# Patient Record
Sex: MALE | Race: WHITE | Employment: OTHER | URBAN - METROPOLITAN AREA
[De-identification: names, ages, dates, MRNs, and addresses within clinical notes are randomized per-mention and may not be internally consistent; named-entity substitution may affect disease eponyms.]

---

## 2019-01-01 ENCOUNTER — APPOINTMENT (OUTPATIENT)
Dept: CT IMAGING | Age: 84
DRG: 283 | End: 2019-01-01
Payer: MEDICARE

## 2019-01-01 ENCOUNTER — APPOINTMENT (OUTPATIENT)
Dept: GENERAL RADIOLOGY | Age: 84
DRG: 283 | End: 2019-01-01
Payer: MEDICARE

## 2019-01-01 ENCOUNTER — APPOINTMENT (OUTPATIENT)
Dept: MRI IMAGING | Age: 84
DRG: 283 | End: 2019-01-01
Payer: MEDICARE

## 2019-01-01 ENCOUNTER — HOSPITAL ENCOUNTER (INPATIENT)
Age: 84
LOS: 3 days | DRG: 283 | End: 2019-05-02
Attending: EMERGENCY MEDICINE | Admitting: INTERNAL MEDICINE
Payer: MEDICARE

## 2019-01-01 VITALS
TEMPERATURE: 98.1 F | OXYGEN SATURATION: 93 % | SYSTOLIC BLOOD PRESSURE: 118 MMHG | WEIGHT: 315 LBS | BODY MASS INDEX: 42.66 KG/M2 | RESPIRATION RATE: 20 BRPM | DIASTOLIC BLOOD PRESSURE: 63 MMHG | HEIGHT: 72 IN | HEART RATE: 70 BPM

## 2019-01-01 DIAGNOSIS — R79.89 INCREASED AMMONIA LEVEL: ICD-10-CM

## 2019-01-01 DIAGNOSIS — S12.120A OTHER CLOSED DISPLACED ODONTOID FRACTURE, INITIAL ENCOUNTER (HCC): ICD-10-CM

## 2019-01-01 DIAGNOSIS — I46.9 CARDIAC ARREST (HCC): Primary | ICD-10-CM

## 2019-01-01 DIAGNOSIS — S12.01XA CLOSED JEFFERSON FRACTURE, INITIAL ENCOUNTER (HCC): ICD-10-CM

## 2019-01-01 LAB
-: NORMAL
ABSOLUTE EOS #: 0.04 K/UL (ref 0–0.44)
ABSOLUTE EOS #: 0.04 K/UL (ref 0–0.44)
ABSOLUTE EOS #: 0.12 K/UL (ref 0–0.44)
ABSOLUTE EOS #: 0.18 K/UL (ref 0–0.4)
ABSOLUTE IMMATURE GRANULOCYTE: 0 K/UL (ref 0–0.3)
ABSOLUTE IMMATURE GRANULOCYTE: 0.06 K/UL (ref 0–0.3)
ABSOLUTE IMMATURE GRANULOCYTE: 0.07 K/UL (ref 0–0.3)
ABSOLUTE IMMATURE GRANULOCYTE: 0.23 K/UL (ref 0–0.3)
ABSOLUTE LYMPH #: 0.77 K/UL (ref 1.1–3.7)
ABSOLUTE LYMPH #: 1.17 K/UL (ref 1.1–3.7)
ABSOLUTE LYMPH #: 1.44 K/UL (ref 1.1–3.7)
ABSOLUTE LYMPH #: 4.39 K/UL (ref 1–4.8)
ABSOLUTE MONO #: 0.92 K/UL (ref 0.1–0.8)
ABSOLUTE MONO #: 0.93 K/UL (ref 0.1–1.2)
ABSOLUTE MONO #: 1.13 K/UL (ref 0.1–1.2)
ABSOLUTE MONO #: 1.49 K/UL (ref 0.1–1.2)
ACETAMINOPHEN LEVEL: <5 UG/ML (ref 10–30)
ALBUMIN SERPL-MCNC: 2.2 G/DL (ref 3.5–5.2)
ALBUMIN SERPL-MCNC: 2.7 G/DL (ref 3.5–5.2)
ALBUMIN SERPL-MCNC: 3 G/DL (ref 3.5–5.2)
ALBUMIN SERPL-MCNC: 3.2 G/DL (ref 3.5–5.2)
ALBUMIN/GLOBULIN RATIO: 1 (ref 1–2.5)
ALBUMIN/GLOBULIN RATIO: 1.1 (ref 1–2.5)
ALBUMIN/GLOBULIN RATIO: 1.2 (ref 1–2.5)
ALBUMIN/GLOBULIN RATIO: 1.3 (ref 1–2.5)
ALLEN TEST: ABNORMAL
ALLEN TEST: POSITIVE
ALLEN TEST: POSITIVE
ALP BLD-CCNC: 108 U/L (ref 40–129)
ALP BLD-CCNC: 133 U/L (ref 40–129)
ALP BLD-CCNC: 77 U/L (ref 40–129)
ALP BLD-CCNC: 94 U/L (ref 40–129)
ALT SERPL-CCNC: 135 U/L (ref 5–41)
ALT SERPL-CCNC: 231 U/L (ref 5–41)
ALT SERPL-CCNC: 359 U/L (ref 5–41)
ALT SERPL-CCNC: 496 U/L (ref 5–41)
AMMONIA: 64 UMOL/L (ref 16–60)
AMORPHOUS: NORMAL
AMPHETAMINE SCREEN URINE: NEGATIVE
ANION GAP SERPL CALCULATED.3IONS-SCNC: 10 MMOL/L (ref 9–17)
ANION GAP SERPL CALCULATED.3IONS-SCNC: 11 MMOL/L (ref 9–17)
ANION GAP SERPL CALCULATED.3IONS-SCNC: 6 MMOL/L (ref 9–17)
ANION GAP SERPL CALCULATED.3IONS-SCNC: 7 MMOL/L (ref 9–17)
ANION GAP SERPL CALCULATED.3IONS-SCNC: 9 MMOL/L (ref 9–17)
ANION GAP SERPL CALCULATED.3IONS-SCNC: 9 MMOL/L (ref 9–17)
ANION GAP: 10 MMOL/L (ref 7–16)
ANION GAP: 13 MMOL/L (ref 7–16)
AST SERPL-CCNC: 188 U/L
AST SERPL-CCNC: 25 U/L
AST SERPL-CCNC: 354 U/L
AST SERPL-CCNC: 69 U/L
BACTERIA: NORMAL
BARBITURATE SCREEN URINE: NEGATIVE
BASOPHILS # BLD: 0 % (ref 0–2)
BASOPHILS ABSOLUTE: 0 K/UL (ref 0–0.2)
BASOPHILS ABSOLUTE: 0.03 K/UL (ref 0–0.2)
BASOPHILS ABSOLUTE: 0.03 K/UL (ref 0–0.2)
BASOPHILS ABSOLUTE: <0.03 K/UL (ref 0–0.2)
BENZODIAZEPINE SCREEN, URINE: NEGATIVE
BILIRUB SERPL-MCNC: 0.3 MG/DL (ref 0.3–1.2)
BILIRUB SERPL-MCNC: 0.36 MG/DL (ref 0.3–1.2)
BILIRUB SERPL-MCNC: 0.42 MG/DL (ref 0.3–1.2)
BILIRUB SERPL-MCNC: 0.79 MG/DL (ref 0.3–1.2)
BILIRUBIN URINE: NEGATIVE
BUN BLDV-MCNC: 22 MG/DL (ref 8–23)
BUN BLDV-MCNC: 22 MG/DL (ref 8–23)
BUN BLDV-MCNC: 23 MG/DL (ref 8–23)
BUN BLDV-MCNC: 24 MG/DL (ref 8–23)
BUN BLDV-MCNC: 24 MG/DL (ref 8–23)
BUN BLDV-MCNC: 25 MG/DL (ref 8–23)
BUN BLDV-MCNC: 27 MG/DL (ref 8–23)
BUN/CREAT BLD: ABNORMAL (ref 9–20)
BUPRENORPHINE URINE: NORMAL
CALCIUM IONIZED: 1.14 MMOL/L (ref 1.13–1.33)
CALCIUM IONIZED: 1.15 MMOL/L (ref 1.13–1.33)
CALCIUM IONIZED: 1.15 MMOL/L (ref 1.13–1.33)
CALCIUM IONIZED: 1.16 MMOL/L (ref 1.13–1.33)
CALCIUM IONIZED: 1.19 MMOL/L (ref 1.13–1.33)
CALCIUM SERPL-MCNC: 7.7 MG/DL (ref 8.6–10.4)
CALCIUM SERPL-MCNC: 7.7 MG/DL (ref 8.6–10.4)
CALCIUM SERPL-MCNC: 7.9 MG/DL (ref 8.6–10.4)
CALCIUM SERPL-MCNC: 7.9 MG/DL (ref 8.6–10.4)
CALCIUM SERPL-MCNC: 8 MG/DL (ref 8.6–10.4)
CALCIUM SERPL-MCNC: 8.1 MG/DL (ref 8.6–10.4)
CALCIUM SERPL-MCNC: 8.3 MG/DL (ref 8.6–10.4)
CALCIUM SERPL-MCNC: 8.4 MG/DL (ref 8.6–10.4)
CALCIUM SERPL-MCNC: 8.6 MG/DL (ref 8.6–10.4)
CANNABINOID SCREEN URINE: NEGATIVE
CASTS UA: NORMAL /LPF (ref 0–8)
CHLORIDE BLD-SCNC: 107 MMOL/L (ref 98–107)
CHLORIDE BLD-SCNC: 108 MMOL/L (ref 98–107)
CHLORIDE BLD-SCNC: 110 MMOL/L (ref 98–107)
CHLORIDE BLD-SCNC: 111 MMOL/L (ref 98–107)
CHLORIDE BLD-SCNC: 112 MMOL/L (ref 98–107)
CHLORIDE BLD-SCNC: 113 MMOL/L (ref 98–107)
CHLORIDE BLD-SCNC: 113 MMOL/L (ref 98–107)
CHLORIDE BLD-SCNC: 114 MMOL/L (ref 98–107)
CHLORIDE BLD-SCNC: 115 MMOL/L (ref 98–107)
CO2: 18 MMOL/L (ref 20–31)
CO2: 19 MMOL/L (ref 20–31)
CO2: 20 MMOL/L (ref 20–31)
CO2: 21 MMOL/L (ref 20–31)
COCAINE METABOLITE, URINE: NEGATIVE
COLOR: YELLOW
COMMENT UA: ABNORMAL
CREAT SERPL-MCNC: 0.95 MG/DL (ref 0.7–1.2)
CREAT SERPL-MCNC: 1.01 MG/DL (ref 0.7–1.2)
CREAT SERPL-MCNC: 1.05 MG/DL (ref 0.7–1.2)
CREAT SERPL-MCNC: 1.06 MG/DL (ref 0.7–1.2)
CREAT SERPL-MCNC: 1.06 MG/DL (ref 0.7–1.2)
CREAT SERPL-MCNC: 1.22 MG/DL (ref 0.7–1.2)
CREAT SERPL-MCNC: 1.27 MG/DL (ref 0.7–1.2)
CREAT SERPL-MCNC: 1.39 MG/DL (ref 0.7–1.2)
CREAT SERPL-MCNC: 1.55 MG/DL (ref 0.7–1.2)
CRYSTALS, UA: NORMAL /HPF
CULTURE: NO GROWTH
DIFFERENTIAL TYPE: ABNORMAL
EKG ATRIAL RATE: 42 BPM
EKG ATRIAL RATE: 49 BPM
EKG ATRIAL RATE: 67 BPM
EKG P AXIS: 33 DEGREES
EKG P AXIS: 45 DEGREES
EKG P AXIS: 52 DEGREES
EKG P-R INTERVAL: 178 MS
EKG P-R INTERVAL: 190 MS
EKG P-R INTERVAL: 206 MS
EKG Q-T INTERVAL: 466 MS
EKG Q-T INTERVAL: 548 MS
EKG Q-T INTERVAL: 580 MS
EKG QRS DURATION: 104 MS
EKG QRS DURATION: 112 MS
EKG QRS DURATION: 118 MS
EKG QTC CALCULATION (BAZETT): 457 MS
EKG QTC CALCULATION (BAZETT): 492 MS
EKG QTC CALCULATION (BAZETT): 523 MS
EKG R AXIS: -3 DEGREES
EKG R AXIS: -7 DEGREES
EKG R AXIS: 3 DEGREES
EKG T AXIS: 108 DEGREES
EKG T AXIS: 109 DEGREES
EKG T AXIS: 54 DEGREES
EKG VENTRICULAR RATE: 42 BPM
EKG VENTRICULAR RATE: 49 BPM
EKG VENTRICULAR RATE: 67 BPM
EOSINOPHILS RELATIVE PERCENT: 0 % (ref 1–4)
EOSINOPHILS RELATIVE PERCENT: 0 % (ref 1–4)
EOSINOPHILS RELATIVE PERCENT: 1 % (ref 1–4)
EOSINOPHILS RELATIVE PERCENT: 1 % (ref 1–4)
EPITHELIAL CELLS UA: NORMAL /HPF (ref 0–5)
ETHANOL PERCENT: <0.01 %
ETHANOL: <10 MG/DL
FIO2: 100
FIO2: 40
FIO2: 45
FIO2: 50
FIO2: ABNORMAL
GFR AFRICAN AMERICAN: 52 ML/MIN
GFR AFRICAN AMERICAN: 59 ML/MIN
GFR AFRICAN AMERICAN: >60 ML/MIN
GFR NON-AFRICAN AMERICAN: 43 ML/MIN
GFR NON-AFRICAN AMERICAN: 49 ML/MIN
GFR NON-AFRICAN AMERICAN: 49 ML/MIN
GFR NON-AFRICAN AMERICAN: 54 ML/MIN
GFR NON-AFRICAN AMERICAN: 57 ML/MIN
GFR NON-AFRICAN AMERICAN: >60 ML/MIN
GFR SERPL CREATININE-BSD FRML MDRD: 59 ML/MIN
GFR SERPL CREATININE-BSD FRML MDRD: >60 ML/MIN
GFR SERPL CREATININE-BSD FRML MDRD: ABNORMAL ML/MIN/{1.73_M2}
GFR SERPL CREATININE-BSD FRML MDRD: NORMAL ML/MIN/{1.73_M2}
GLUCOSE BLD-MCNC: 126 MG/DL (ref 70–99)
GLUCOSE BLD-MCNC: 126 MG/DL (ref 70–99)
GLUCOSE BLD-MCNC: 129 MG/DL (ref 70–99)
GLUCOSE BLD-MCNC: 129 MG/DL (ref 74–100)
GLUCOSE BLD-MCNC: 130 MG/DL (ref 70–99)
GLUCOSE BLD-MCNC: 131 MG/DL (ref 70–99)
GLUCOSE BLD-MCNC: 146 MG/DL (ref 70–99)
GLUCOSE BLD-MCNC: 156 MG/DL (ref 70–99)
GLUCOSE BLD-MCNC: 164 MG/DL (ref 70–99)
GLUCOSE BLD-MCNC: 185 MG/DL (ref 70–99)
GLUCOSE BLD-MCNC: 187 MG/DL (ref 74–100)
GLUCOSE URINE: ABNORMAL
HAV IGM SER IA-ACNC: NONREACTIVE
HCO3 VENOUS: 22.3 MMOL/L (ref 22–29)
HCT VFR BLD CALC: 31.4 % (ref 40.7–50.3)
HCT VFR BLD CALC: 32.1 % (ref 40.7–50.3)
HCT VFR BLD CALC: 33.7 % (ref 40.7–50.3)
HCT VFR BLD CALC: 34.9 % (ref 40.7–50.3)
HCT VFR BLD CALC: 35.7 % (ref 40.7–50.3)
HCT VFR BLD CALC: 36.4 % (ref 40.7–50.3)
HCT VFR BLD CALC: 37 % (ref 40.7–50.3)
HCT VFR BLD CALC: 37 % (ref 40.7–50.3)
HCT VFR BLD CALC: 37.4 % (ref 40.7–50.3)
HCT VFR BLD CALC: 38.6 % (ref 40.7–50.3)
HCT VFR BLD CALC: 41.7 % (ref 40.7–50.3)
HEMOGLOBIN: 10 G/DL (ref 13–17)
HEMOGLOBIN: 10.4 G/DL (ref 13–17)
HEMOGLOBIN: 11.3 G/DL (ref 13–17)
HEMOGLOBIN: 11.3 G/DL (ref 13–17)
HEMOGLOBIN: 11.6 G/DL (ref 13–17)
HEMOGLOBIN: 12.1 G/DL (ref 13–17)
HEMOGLOBIN: 12.2 G/DL (ref 13–17)
HEMOGLOBIN: 12.3 G/DL (ref 13–17)
HEMOGLOBIN: 12.5 G/DL (ref 13–17)
HEMOGLOBIN: 13.1 G/DL (ref 13–17)
HEMOGLOBIN: 9.9 G/DL (ref 13–17)
HEPATITIS B CORE IGM ANTIBODY: NONREACTIVE
HEPATITIS B SURFACE ANTIGEN: NONREACTIVE
HEPATITIS C ANTIBODY: NONREACTIVE
HIV AG/AB: NONREACTIVE
IMMATURE GRANULOCYTES: 0 %
IMMATURE GRANULOCYTES: 1 %
INR BLD: 1
INR BLD: 1.1
KETONES, URINE: NEGATIVE
LACTIC ACID, SEPSIS WHOLE BLOOD: 1.2 MMOL/L (ref 0.5–1.9)
LACTIC ACID, SEPSIS: NORMAL MMOL/L (ref 0.5–1.9)
LACTIC ACID, WHOLE BLOOD: 1.4 MMOL/L (ref 0.7–2.1)
LACTIC ACID, WHOLE BLOOD: 1.4 MMOL/L (ref 0.7–2.1)
LACTIC ACID, WHOLE BLOOD: 1.5 MMOL/L (ref 0.7–2.1)
LACTIC ACID, WHOLE BLOOD: 2.3 MMOL/L (ref 0.7–2.1)
LACTIC ACID: NORMAL MMOL/L
LACTIC ACID: NORMAL MMOL/L
LEUKOCYTE ESTERASE, URINE: NEGATIVE
LIPASE: 36 U/L (ref 13–60)
LV EF: 45 %
LVEF MODALITY: NORMAL
LYMPHOCYTES # BLD: 13 % (ref 24–43)
LYMPHOCYTES # BLD: 24 % (ref 24–44)
LYMPHOCYTES # BLD: 7 % (ref 24–43)
LYMPHOCYTES # BLD: 8 % (ref 24–43)
Lab: NORMAL
MAGNESIUM: 1.8 MG/DL (ref 1.6–2.6)
MCH RBC QN AUTO: 29.8 PG (ref 25.2–33.5)
MCH RBC QN AUTO: 30.5 PG (ref 25.2–33.5)
MCH RBC QN AUTO: 30.8 PG (ref 25.2–33.5)
MCH RBC QN AUTO: 30.9 PG (ref 25.2–33.5)
MCH RBC QN AUTO: 30.9 PG (ref 25.2–33.5)
MCHC RBC AUTO-ENTMCNC: 31.4 G/DL (ref 28.4–34.8)
MCHC RBC AUTO-ENTMCNC: 31.4 G/DL (ref 28.4–34.8)
MCHC RBC AUTO-ENTMCNC: 31.5 G/DL (ref 28.4–34.8)
MCHC RBC AUTO-ENTMCNC: 32.4 G/DL (ref 28.4–34.8)
MCHC RBC AUTO-ENTMCNC: 33 G/DL (ref 28.4–34.8)
MCV RBC AUTO: 93.7 FL (ref 82.6–102.9)
MCV RBC AUTO: 94.1 FL (ref 82.6–102.9)
MCV RBC AUTO: 95.1 FL (ref 82.6–102.9)
MCV RBC AUTO: 98.1 FL (ref 82.6–102.9)
MCV RBC AUTO: 98.1 FL (ref 82.6–102.9)
MDMA URINE: NORMAL
METHADONE SCREEN, URINE: NEGATIVE
METHAMPHETAMINE, URINE: NORMAL
MODE: ABNORMAL
MONOCYTES # BLD: 12 % (ref 3–12)
MONOCYTES # BLD: 5 % (ref 1–7)
MONOCYTES # BLD: 8 % (ref 3–12)
MONOCYTES # BLD: 9 % (ref 3–12)
MORPHOLOGY: NORMAL
MRSA, DNA, NASAL: NORMAL
MUCUS: NORMAL
MYOGLOBIN: 291 NG/ML (ref 28–72)
NEGATIVE BASE EXCESS, ART: 3 (ref 0–2)
NEGATIVE BASE EXCESS, ART: 4 (ref 0–2)
NEGATIVE BASE EXCESS, ART: 5 (ref 0–2)
NEGATIVE BASE EXCESS, ART: 7 (ref 0–2)
NEGATIVE BASE EXCESS, ART: 7 (ref 0–2)
NEGATIVE BASE EXCESS, ART: 8 (ref 0–2)
NEGATIVE BASE EXCESS, VEN: 7 (ref 0–2)
NITRITE, URINE: NEGATIVE
NRBC AUTOMATED: 0 PER 100 WBC
O2 DEVICE/FLOW/%: ABNORMAL
O2 SAT, VEN: 87 % (ref 60–85)
OPIATES, URINE: NEGATIVE
OTHER OBSERVATIONS UA: NORMAL
OXYCODONE SCREEN URINE: NEGATIVE
PARTIAL THROMBOPLASTIN TIME: 21.9 SEC (ref 20.5–30.5)
PARTIAL THROMBOPLASTIN TIME: 22.7 SEC (ref 20.5–30.5)
PARTIAL THROMBOPLASTIN TIME: 30.1 SEC (ref 20.5–30.5)
PARTIAL THROMBOPLASTIN TIME: 44.4 SEC (ref 20.5–30.5)
PARTIAL THROMBOPLASTIN TIME: 49.3 SEC (ref 20.5–30.5)
PARTIAL THROMBOPLASTIN TIME: 52.5 SEC (ref 20.5–30.5)
PARTIAL THROMBOPLASTIN TIME: 60.7 SEC (ref 20.5–30.5)
PARTIAL THROMBOPLASTIN TIME: 62.1 SEC (ref 20.5–30.5)
PARTIAL THROMBOPLASTIN TIME: 76.8 SEC (ref 20.5–30.5)
PARTIAL THROMBOPLASTIN TIME: 81.4 SEC (ref 20.5–30.5)
PATIENT TEMP: 33
PATIENT TEMP: 33.1
PATIENT TEMP: ABNORMAL
PCO2, VEN: 60.8 MM HG (ref 41–51)
PDW BLD-RTO: 14 % (ref 11.8–14.4)
PDW BLD-RTO: 14.2 % (ref 11.8–14.4)
PDW BLD-RTO: 14.4 % (ref 11.8–14.4)
PDW BLD-RTO: 14.6 % (ref 11.8–14.4)
PDW BLD-RTO: 15 % (ref 11.8–14.4)
PH UA: 6 (ref 5–8)
PH VENOUS: 7.17 (ref 7.32–7.43)
PHENCYCLIDINE, URINE: NEGATIVE
PHOSPHORUS: 2.6 MG/DL (ref 2.5–4.5)
PHOSPHORUS: 2.7 MG/DL (ref 2.5–4.5)
PHOSPHORUS: 2.8 MG/DL (ref 2.5–4.5)
PHOSPHORUS: 2.9 MG/DL (ref 2.5–4.5)
PHOSPHORUS: 2.9 MG/DL (ref 2.5–4.5)
PLATELET # BLD: 116 K/UL (ref 138–453)
PLATELET # BLD: 148 K/UL (ref 138–453)
PLATELET # BLD: ABNORMAL K/UL (ref 138–453)
PLATELET ESTIMATE: ABNORMAL
PLATELET, FLUORESCENCE: 107 K/UL (ref 138–453)
PLATELET, FLUORESCENCE: 115 K/UL (ref 138–453)
PLATELET, FLUORESCENCE: 119 K/UL (ref 138–453)
PLATELET, IMMATURE FRACTION: 3.1 % (ref 1.1–10.3)
PLATELET, IMMATURE FRACTION: 3.2 % (ref 1.1–10.3)
PLATELET, IMMATURE FRACTION: 4.2 % (ref 1.1–10.3)
PMV BLD AUTO: 11.4 FL (ref 8.1–13.5)
PMV BLD AUTO: 11.5 FL (ref 8.1–13.5)
PMV BLD AUTO: ABNORMAL FL (ref 8.1–13.5)
PO2, VEN: 68.2 MM HG (ref 30–50)
POC CHLORIDE: 110 MMOL/L (ref 98–107)
POC CHLORIDE: 114 MMOL/L (ref 98–107)
POC CREATININE: 1.1 MG/DL (ref 0.51–1.19)
POC CREATININE: 1.39 MG/DL (ref 0.51–1.19)
POC HCO3: 17.4 MMOL/L (ref 21–28)
POC HCO3: 19.6 MMOL/L (ref 21–28)
POC HCO3: 20.4 MMOL/L (ref 21–28)
POC HCO3: 20.9 MMOL/L (ref 21–28)
POC HCO3: 21.2 MMOL/L (ref 21–28)
POC HCO3: 21.8 MMOL/L (ref 21–28)
POC HCO3: 22.4 MMOL/L (ref 21–28)
POC HCO3: 22.8 MMOL/L (ref 21–28)
POC HEMATOCRIT: 34 % (ref 41–53)
POC HEMATOCRIT: 41 % (ref 41–53)
POC HEMOGLOBIN: 11.5 G/DL (ref 13.5–17.5)
POC HEMOGLOBIN: 13.8 G/DL (ref 13.5–17.5)
POC IONIZED CALCIUM: 1.2 MMOL/L (ref 1.15–1.33)
POC IONIZED CALCIUM: 1.24 MMOL/L (ref 1.15–1.33)
POC LACTIC ACID: 1.36 MMOL/L (ref 0.56–1.39)
POC LACTIC ACID: 2.74 MMOL/L (ref 0.56–1.39)
POC O2 SATURATION: 100 % (ref 94–98)
POC O2 SATURATION: 93 % (ref 94–98)
POC O2 SATURATION: 94 % (ref 94–98)
POC O2 SATURATION: 94 % (ref 94–98)
POC O2 SATURATION: 95 % (ref 94–98)
POC O2 SATURATION: 96 % (ref 94–98)
POC O2 SATURATION: 96 % (ref 94–98)
POC O2 SATURATION: 97 % (ref 94–98)
POC PCO2 TEMP: 33 MM HG
POC PCO2 TEMP: 34 MM HG
POC PCO2 TEMP: ABNORMAL MM HG
POC PCO2: 35.5 MM HG (ref 35–48)
POC PCO2: 39.8 MM HG (ref 35–48)
POC PCO2: 39.8 MM HG (ref 35–48)
POC PCO2: 41.4 MM HG (ref 35–48)
POC PCO2: 41.8 MM HG (ref 35–48)
POC PCO2: 42.9 MM HG (ref 35–48)
POC PCO2: 48.4 MM HG (ref 35–48)
POC PCO2: 49 MM HG (ref 35–48)
POC PH TEMP: 7.38
POC PH TEMP: 7.39
POC PH TEMP: ABNORMAL
POC PH: 7.23 (ref 7.35–7.45)
POC PH: 7.28 (ref 7.35–7.45)
POC PH: 7.28 (ref 7.35–7.45)
POC PH: 7.3 (ref 7.35–7.45)
POC PH: 7.31 (ref 7.35–7.45)
POC PH: 7.33 (ref 7.35–7.45)
POC PH: 7.33 (ref 7.35–7.45)
POC PH: 7.34 (ref 7.35–7.45)
POC PO2 TEMP: 70 MM HG
POC PO2 TEMP: 74 MM HG
POC PO2 TEMP: ABNORMAL MM HG
POC PO2: 236.8 MM HG (ref 83–108)
POC PO2: 74.2 MM HG (ref 83–108)
POC PO2: 78.3 MM HG (ref 83–108)
POC PO2: 78.9 MM HG (ref 83–108)
POC PO2: 85.8 MM HG (ref 83–108)
POC PO2: 90.6 MM HG (ref 83–108)
POC PO2: 94.4 MM HG (ref 83–108)
POC PO2: 94.9 MM HG (ref 83–108)
POC POTASSIUM: 3.9 MMOL/L (ref 3.5–4.5)
POC POTASSIUM: 4.3 MMOL/L (ref 3.5–4.5)
POC SODIUM: 142 MMOL/L (ref 138–146)
POC SODIUM: 144 MMOL/L (ref 138–146)
POSITIVE BASE EXCESS, ART: ABNORMAL (ref 0–3)
POSITIVE BASE EXCESS, VEN: ABNORMAL (ref 0–3)
POTASSIUM SERPL-SCNC: 3.8 MMOL/L (ref 3.7–5.3)
POTASSIUM SERPL-SCNC: 4 MMOL/L (ref 3.7–5.3)
POTASSIUM SERPL-SCNC: 4.3 MMOL/L (ref 3.7–5.3)
POTASSIUM SERPL-SCNC: 4.3 MMOL/L (ref 3.7–5.3)
POTASSIUM SERPL-SCNC: 4.4 MMOL/L (ref 3.7–5.3)
POTASSIUM SERPL-SCNC: 4.5 MMOL/L (ref 3.7–5.3)
POTASSIUM SERPL-SCNC: 4.7 MMOL/L (ref 3.7–5.3)
POTASSIUM SERPL-SCNC: 4.7 MMOL/L (ref 3.7–5.3)
POTASSIUM SERPL-SCNC: 5.6 MMOL/L (ref 3.7–5.3)
PROPOXYPHENE, URINE: NORMAL
PROTEIN UA: ABNORMAL
PROTHROMBIN TIME: 10.7 SEC (ref 9–12)
PROTHROMBIN TIME: 11.2 SEC (ref 9–12)
RBC # BLD: 3.2 M/UL (ref 4.21–5.77)
RBC # BLD: 3.89 M/UL (ref 4.21–5.77)
RBC # BLD: 3.95 M/UL (ref 4.21–5.77)
RBC # BLD: 4.1 M/UL (ref 4.21–5.77)
RBC # BLD: 4.25 M/UL (ref 4.21–5.77)
RBC # BLD: ABNORMAL 10*6/UL
RBC UA: NORMAL /HPF (ref 0–4)
RENAL EPITHELIAL, UA: NORMAL /HPF
SALICYLATE LEVEL: <1 MG/DL (ref 3–10)
SAMPLE SITE: ABNORMAL
SEG NEUTROPHILS: 70 % (ref 36–66)
SEG NEUTROPHILS: 73 % (ref 36–65)
SEG NEUTROPHILS: 83 % (ref 36–65)
SEG NEUTROPHILS: 83 % (ref 36–65)
SEGMENTED NEUTROPHILS ABSOLUTE COUNT: 12.81 K/UL (ref 1.8–7.7)
SEGMENTED NEUTROPHILS ABSOLUTE COUNT: 15.8 K/UL (ref 1.5–8.1)
SEGMENTED NEUTROPHILS ABSOLUTE COUNT: 6.74 K/UL (ref 1.5–8.1)
SEGMENTED NEUTROPHILS ABSOLUTE COUNT: 9.06 K/UL (ref 1.5–8.1)
SODIUM BLD-SCNC: 137 MMOL/L (ref 135–144)
SODIUM BLD-SCNC: 138 MMOL/L (ref 135–144)
SODIUM BLD-SCNC: 138 MMOL/L (ref 135–144)
SODIUM BLD-SCNC: 139 MMOL/L (ref 135–144)
SODIUM BLD-SCNC: 140 MMOL/L (ref 135–144)
SODIUM BLD-SCNC: 142 MMOL/L (ref 135–144)
SODIUM BLD-SCNC: 142 MMOL/L (ref 135–144)
SPECIFIC GRAVITY UA: 1.01 (ref 1–1.03)
SPECIMEN DESCRIPTION: NORMAL
SPECIMEN DESCRIPTION: NORMAL
TCO2 (CALC), ART: 19 MMOL/L (ref 22–29)
TCO2 (CALC), ART: 21 MMOL/L (ref 22–29)
TCO2 (CALC), ART: 22 MMOL/L (ref 22–29)
TCO2 (CALC), ART: 23 MMOL/L (ref 22–29)
TCO2 (CALC), ART: 24 MMOL/L (ref 22–29)
TCO2 (CALC), ART: 24 MMOL/L (ref 22–29)
TEST INFORMATION: NORMAL
TOTAL CK: 88 U/L (ref 39–308)
TOTAL CO2, VENOUS: 24 MMOL/L (ref 23–30)
TOTAL PROTEIN: 4.4 G/DL (ref 6.4–8.3)
TOTAL PROTEIN: 5 G/DL (ref 6.4–8.3)
TOTAL PROTEIN: 5.3 G/DL (ref 6.4–8.3)
TOTAL PROTEIN: 6 G/DL (ref 6.4–8.3)
TOXIC TRICYCLIC SC,BLOOD: NEGATIVE
TRICHOMONAS: NORMAL
TRICYCLIC ANTIDEPRESSANTS, UR: NORMAL
TROPONIN INTERP: ABNORMAL
TROPONIN T: ABNORMAL NG/ML
TROPONIN, HIGH SENSITIVITY: 109 NG/L (ref 0–22)
TROPONIN, HIGH SENSITIVITY: 29 NG/L (ref 0–22)
TROPONIN, HIGH SENSITIVITY: 50 NG/L (ref 0–22)
TURBIDITY: CLEAR
URINE HGB: ABNORMAL
UROBILINOGEN, URINE: NORMAL
WBC # BLD: 10.9 K/UL (ref 3.5–11.3)
WBC # BLD: 18.3 K/UL (ref 3.5–11.3)
WBC # BLD: 19 K/UL (ref 3.5–11.3)
WBC # BLD: 9.3 K/UL (ref 3.5–11.3)
WBC # BLD: 9.5 K/UL (ref 3.5–11.3)
WBC # BLD: ABNORMAL 10*3/UL
WBC UA: NORMAL /HPF (ref 0–5)
YEAST: NORMAL

## 2019-01-01 PROCEDURE — 82550 ASSAY OF CK (CPK): CPT

## 2019-01-01 PROCEDURE — 6360000002 HC RX W HCPCS: Performed by: STUDENT IN AN ORGANIZED HEALTH CARE EDUCATION/TRAINING PROGRAM

## 2019-01-01 PROCEDURE — 99291 CRITICAL CARE FIRST HOUR: CPT | Performed by: INTERNAL MEDICINE

## 2019-01-01 PROCEDURE — 70450 CT HEAD/BRAIN W/O DYE: CPT

## 2019-01-01 PROCEDURE — 93306 TTE W/DOPPLER COMPLETE: CPT

## 2019-01-01 PROCEDURE — 82947 ASSAY GLUCOSE BLOOD QUANT: CPT

## 2019-01-01 PROCEDURE — 99291 CRITICAL CARE FIRST HOUR: CPT

## 2019-01-01 PROCEDURE — 80074 ACUTE HEPATITIS PANEL: CPT

## 2019-01-01 PROCEDURE — 6360000002 HC RX W HCPCS: Performed by: PSYCHIATRY & NEUROLOGY

## 2019-01-01 PROCEDURE — 83690 ASSAY OF LIPASE: CPT

## 2019-01-01 PROCEDURE — 94770 HC ETCO2 MONITOR DAILY: CPT

## 2019-01-01 PROCEDURE — 6360000002 HC RX W HCPCS: Performed by: HOSPITALIST

## 2019-01-01 PROCEDURE — 72128 CT CHEST SPINE W/O DYE: CPT

## 2019-01-01 PROCEDURE — 94760 N-INVAS EAR/PLS OXIMETRY 1: CPT

## 2019-01-01 PROCEDURE — 2700000000 HC OXYGEN THERAPY PER DAY

## 2019-01-01 PROCEDURE — 87641 MR-STAPH DNA AMP PROBE: CPT

## 2019-01-01 PROCEDURE — 85610 PROTHROMBIN TIME: CPT

## 2019-01-01 PROCEDURE — 82803 BLOOD GASES ANY COMBINATION: CPT

## 2019-01-01 PROCEDURE — 71045 X-RAY EXAM CHEST 1 VIEW: CPT

## 2019-01-01 PROCEDURE — 6360000002 HC RX W HCPCS

## 2019-01-01 PROCEDURE — 6370000000 HC RX 637 (ALT 250 FOR IP): Performed by: HOSPITALIST

## 2019-01-01 PROCEDURE — 87040 BLOOD CULTURE FOR BACTERIA: CPT

## 2019-01-01 PROCEDURE — 2500000003 HC RX 250 WO HCPCS: Performed by: STUDENT IN AN ORGANIZED HEALTH CARE EDUCATION/TRAINING PROGRAM

## 2019-01-01 PROCEDURE — 36556 INSERT NON-TUNNEL CV CATH: CPT

## 2019-01-01 PROCEDURE — 36620 INSERTION CATHETER ARTERY: CPT

## 2019-01-01 PROCEDURE — 85025 COMPLETE CBC W/AUTO DIFF WBC: CPT

## 2019-01-01 PROCEDURE — 2000000000 HC ICU R&B

## 2019-01-01 PROCEDURE — 95951 PR EEG MONITORING/VIDEORECORD: CPT | Performed by: PSYCHIATRY & NEUROLOGY

## 2019-01-01 PROCEDURE — 80053 COMPREHEN METABOLIC PANEL: CPT

## 2019-01-01 PROCEDURE — 84132 ASSAY OF SERUM POTASSIUM: CPT

## 2019-01-01 PROCEDURE — 6370000000 HC RX 637 (ALT 250 FOR IP): Performed by: STUDENT IN AN ORGANIZED HEALTH CARE EDUCATION/TRAINING PROGRAM

## 2019-01-01 PROCEDURE — 84484 ASSAY OF TROPONIN QUANT: CPT

## 2019-01-01 PROCEDURE — 82330 ASSAY OF CALCIUM: CPT

## 2019-01-01 PROCEDURE — 84100 ASSAY OF PHOSPHORUS: CPT

## 2019-01-01 PROCEDURE — 6360000002 HC RX W HCPCS: Performed by: EMERGENCY MEDICINE

## 2019-01-01 PROCEDURE — 80048 BASIC METABOLIC PNL TOTAL CA: CPT

## 2019-01-01 PROCEDURE — G0480 DRUG TEST DEF 1-7 CLASSES: HCPCS

## 2019-01-01 PROCEDURE — 51702 INSERT TEMP BLADDER CATH: CPT

## 2019-01-01 PROCEDURE — 2580000003 HC RX 258: Performed by: STUDENT IN AN ORGANIZED HEALTH CARE EDUCATION/TRAINING PROGRAM

## 2019-01-01 PROCEDURE — 70498 CT ANGIOGRAPHY NECK: CPT

## 2019-01-01 PROCEDURE — 83735 ASSAY OF MAGNESIUM: CPT

## 2019-01-01 PROCEDURE — 85730 THROMBOPLASTIN TIME PARTIAL: CPT

## 2019-01-01 PROCEDURE — 36415 COLL VENOUS BLD VENIPUNCTURE: CPT

## 2019-01-01 PROCEDURE — 2580000003 HC RX 258: Performed by: EMERGENCY MEDICINE

## 2019-01-01 PROCEDURE — 80307 DRUG TEST PRSMV CHEM ANLYZR: CPT

## 2019-01-01 PROCEDURE — 83605 ASSAY OF LACTIC ACID: CPT

## 2019-01-01 PROCEDURE — 94003 VENT MGMT INPAT SUBQ DAY: CPT

## 2019-01-01 PROCEDURE — 85018 HEMOGLOBIN: CPT

## 2019-01-01 PROCEDURE — 99233 SBSQ HOSP IP/OBS HIGH 50: CPT | Performed by: PSYCHIATRY & NEUROLOGY

## 2019-01-01 PROCEDURE — 72170 X-RAY EXAM OF PELVIS: CPT

## 2019-01-01 PROCEDURE — 85014 HEMATOCRIT: CPT

## 2019-01-01 PROCEDURE — 85055 RETICULATED PLATELET ASSAY: CPT

## 2019-01-01 PROCEDURE — 2500000003 HC RX 250 WO HCPCS: Performed by: EMERGENCY MEDICINE

## 2019-01-01 PROCEDURE — 95822 EEG COMA OR SLEEP ONLY: CPT

## 2019-01-01 PROCEDURE — 5A1945Z RESPIRATORY VENTILATION, 24-96 CONSECUTIVE HOURS: ICD-10-PCS | Performed by: INTERNAL MEDICINE

## 2019-01-01 PROCEDURE — 72192 CT PELVIS W/O DYE: CPT

## 2019-01-01 PROCEDURE — 87086 URINE CULTURE/COLONY COUNT: CPT

## 2019-01-01 PROCEDURE — 82565 ASSAY OF CREATININE: CPT

## 2019-01-01 PROCEDURE — 82140 ASSAY OF AMMONIA: CPT

## 2019-01-01 PROCEDURE — 96365 THER/PROPH/DIAG IV INF INIT: CPT

## 2019-01-01 PROCEDURE — 72131 CT LUMBAR SPINE W/O DYE: CPT

## 2019-01-01 PROCEDURE — APPSS15 APP SPLIT SHARED TIME 0-15 MINUTES: Performed by: NURSE PRACTITIONER

## 2019-01-01 PROCEDURE — 31500 INSERT EMERGENCY AIRWAY: CPT

## 2019-01-01 PROCEDURE — 94762 N-INVAS EAR/PLS OXIMTRY CONT: CPT

## 2019-01-01 PROCEDURE — 99222 1ST HOSP IP/OBS MODERATE 55: CPT | Performed by: PSYCHIATRY & NEUROLOGY

## 2019-01-01 PROCEDURE — 2580000003 HC RX 258: Performed by: INTERNAL MEDICINE

## 2019-01-01 PROCEDURE — 82435 ASSAY OF BLOOD CHLORIDE: CPT

## 2019-01-01 PROCEDURE — 94002 VENT MGMT INPAT INIT DAY: CPT

## 2019-01-01 PROCEDURE — 0BH18EZ INSERTION OF ENDOTRACHEAL AIRWAY INTO TRACHEA, VIA NATURAL OR ARTIFICIAL OPENING ENDOSCOPIC: ICD-10-PCS | Performed by: EMERGENCY MEDICINE

## 2019-01-01 PROCEDURE — 6360000004 HC RX CONTRAST MEDICATION: Performed by: EMERGENCY MEDICINE

## 2019-01-01 PROCEDURE — 95951 HC EEG MONITORING VIDEO RECORDING: CPT

## 2019-01-01 PROCEDURE — 72125 CT NECK SPINE W/O DYE: CPT

## 2019-01-01 PROCEDURE — 99232 SBSQ HOSP IP/OBS MODERATE 35: CPT | Performed by: PSYCHIATRY & NEUROLOGY

## 2019-01-01 PROCEDURE — 83874 ASSAY OF MYOGLOBIN: CPT

## 2019-01-01 PROCEDURE — 85027 COMPLETE CBC AUTOMATED: CPT

## 2019-01-01 PROCEDURE — 81001 URINALYSIS AUTO W/SCOPE: CPT

## 2019-01-01 PROCEDURE — 87389 HIV-1 AG W/HIV-1&-2 AB AG IA: CPT

## 2019-01-01 PROCEDURE — 93005 ELECTROCARDIOGRAM TRACING: CPT

## 2019-01-01 PROCEDURE — 2500000003 HC RX 250 WO HCPCS

## 2019-01-01 PROCEDURE — 36600 WITHDRAWAL OF ARTERIAL BLOOD: CPT

## 2019-01-01 PROCEDURE — 84295 ASSAY OF SERUM SODIUM: CPT

## 2019-01-01 RX ORDER — ATROPINE SULFATE 0.4 MG/ML
AMPUL (ML) INJECTION
Status: DISCONTINUED
Start: 2019-01-01 | End: 2019-01-01

## 2019-01-01 RX ORDER — PROPOFOL 10 MG/ML
10 INJECTION, EMULSION INTRAVENOUS
Status: DISCONTINUED | OUTPATIENT
Start: 2019-01-01 | End: 2019-01-01

## 2019-01-01 RX ORDER — 0.9 % SODIUM CHLORIDE 0.9 %
1000 INTRAVENOUS SOLUTION INTRAVENOUS ONCE
Status: COMPLETED | OUTPATIENT
Start: 2019-01-01 | End: 2019-01-01

## 2019-01-01 RX ORDER — 0.9 % SODIUM CHLORIDE 0.9 %
500 INTRAVENOUS SOLUTION INTRAVENOUS ONCE
Status: COMPLETED | OUTPATIENT
Start: 2019-01-01 | End: 2019-01-01

## 2019-01-01 RX ORDER — ATROPINE SULFATE 0.1 MG/ML
0.5 INJECTION INTRAVENOUS ONCE
Status: DISCONTINUED | OUTPATIENT
Start: 2019-01-01 | End: 2019-01-01

## 2019-01-01 RX ORDER — AMLODIPINE BESYLATE 5 MG/1
5 TABLET ORAL DAILY
Status: DISCONTINUED | OUTPATIENT
Start: 2019-01-01 | End: 2019-01-01

## 2019-01-01 RX ORDER — MORPHINE SULFATE 4 MG/ML
4 INJECTION, SOLUTION INTRAMUSCULAR; INTRAVENOUS
Status: DISCONTINUED | OUTPATIENT
Start: 2019-01-01 | End: 2019-01-01 | Stop reason: HOSPADM

## 2019-01-01 RX ORDER — ASPIRIN 81 MG/1
81 TABLET, CHEWABLE ORAL DAILY
Status: DISCONTINUED | OUTPATIENT
Start: 2019-01-01 | End: 2019-01-01

## 2019-01-01 RX ORDER — LORAZEPAM 2 MG/ML
1 INJECTION INTRAMUSCULAR ONCE
Status: DISCONTINUED | OUTPATIENT
Start: 2019-01-01 | End: 2019-01-01

## 2019-01-01 RX ORDER — SODIUM CHLORIDE 9 MG/ML
INJECTION, SOLUTION INTRAVENOUS CONTINUOUS
Status: DISCONTINUED | OUTPATIENT
Start: 2019-01-01 | End: 2019-01-01

## 2019-01-01 RX ORDER — MORPHINE SULFATE 2 MG/ML
2 INJECTION, SOLUTION INTRAMUSCULAR; INTRAVENOUS
Status: DISCONTINUED | OUTPATIENT
Start: 2019-01-01 | End: 2019-01-01

## 2019-01-01 RX ORDER — LORAZEPAM 2 MG/ML
1 INJECTION INTRAMUSCULAR
Status: DISCONTINUED | OUTPATIENT
Start: 2019-01-01 | End: 2019-01-01 | Stop reason: HOSPADM

## 2019-01-01 RX ORDER — ATROPINE SULFATE 0.1 MG/ML
1 INJECTION INTRAVENOUS ONCE
Status: DISCONTINUED | OUTPATIENT
Start: 2019-01-01 | End: 2019-01-01

## 2019-01-01 RX ORDER — FENTANYL CITRATE 50 UG/ML
50 INJECTION, SOLUTION INTRAMUSCULAR; INTRAVENOUS ONCE
Status: DISCONTINUED | OUTPATIENT
Start: 2019-01-01 | End: 2019-01-01

## 2019-01-01 RX ORDER — SODIUM CHLORIDE 0.9 % (FLUSH) 0.9 %
10 SYRINGE (ML) INJECTION PRN
Status: DISCONTINUED | OUTPATIENT
Start: 2019-01-01 | End: 2019-01-01

## 2019-01-01 RX ORDER — ONDANSETRON 2 MG/ML
4 INJECTION INTRAMUSCULAR; INTRAVENOUS EVERY 6 HOURS PRN
Status: DISCONTINUED | OUTPATIENT
Start: 2019-01-01 | End: 2019-01-01

## 2019-01-01 RX ORDER — VECURONIUM BROMIDE 1 MG/ML
10 INJECTION, POWDER, LYOPHILIZED, FOR SOLUTION INTRAVENOUS ONCE
Status: COMPLETED | OUTPATIENT
Start: 2019-01-01 | End: 2019-01-01

## 2019-01-01 RX ORDER — LORAZEPAM 2 MG/ML
0.5 INJECTION INTRAMUSCULAR
Status: DISCONTINUED | OUTPATIENT
Start: 2019-01-01 | End: 2019-01-01 | Stop reason: HOSPADM

## 2019-01-01 RX ORDER — FENTANYL CITRATE 50 UG/ML
50 INJECTION, SOLUTION INTRAMUSCULAR; INTRAVENOUS ONCE
Status: COMPLETED | OUTPATIENT
Start: 2019-01-01 | End: 2019-01-01

## 2019-01-01 RX ORDER — HEPARIN SODIUM 1000 [USP'U]/ML
4000 INJECTION, SOLUTION INTRAVENOUS; SUBCUTANEOUS PRN
Status: DISCONTINUED | OUTPATIENT
Start: 2019-01-01 | End: 2019-01-01

## 2019-01-01 RX ORDER — SODIUM CHLORIDE 0.9 % (FLUSH) 0.9 %
10 SYRINGE (ML) INJECTION EVERY 12 HOURS SCHEDULED
Status: DISCONTINUED | OUTPATIENT
Start: 2019-01-01 | End: 2019-01-01

## 2019-01-01 RX ORDER — 0.9 % SODIUM CHLORIDE 0.9 %
500 INTRAVENOUS SOLUTION INTRAVENOUS ONCE
Status: DISCONTINUED | OUTPATIENT
Start: 2019-01-01 | End: 2019-01-01

## 2019-01-01 RX ORDER — SODIUM CHLORIDE, SODIUM LACTATE, POTASSIUM CHLORIDE, AND CALCIUM CHLORIDE .6; .31; .03; .02 G/100ML; G/100ML; G/100ML; G/100ML
1000 INJECTION, SOLUTION INTRAVENOUS ONCE
Status: COMPLETED | OUTPATIENT
Start: 2019-01-01 | End: 2019-01-01

## 2019-01-01 RX ORDER — GLYCOPYRROLATE 0.2 MG/ML
0.2 INJECTION INTRAMUSCULAR; INTRAVENOUS EVERY 4 HOURS PRN
Status: DISCONTINUED | OUTPATIENT
Start: 2019-01-01 | End: 2019-01-01 | Stop reason: HOSPADM

## 2019-01-01 RX ORDER — LORAZEPAM 2 MG/ML
1 INJECTION INTRAMUSCULAR ONCE
Status: COMPLETED | OUTPATIENT
Start: 2019-01-01 | End: 2019-01-01

## 2019-01-01 RX ORDER — MORPHINE SULFATE 2 MG/ML
2 INJECTION, SOLUTION INTRAMUSCULAR; INTRAVENOUS
Status: DISCONTINUED | OUTPATIENT
Start: 2019-01-01 | End: 2019-01-01 | Stop reason: HOSPADM

## 2019-01-01 RX ORDER — HEPARIN SODIUM 10000 [USP'U]/100ML
6.88 INJECTION, SOLUTION INTRAVENOUS CONTINUOUS
Status: DISCONTINUED | OUTPATIENT
Start: 2019-01-01 | End: 2019-01-01

## 2019-01-01 RX ORDER — DEXTROSE MONOHYDRATE 50 MG/ML
INJECTION, SOLUTION INTRAVENOUS
Status: DISCONTINUED
Start: 2019-01-01 | End: 2019-01-01

## 2019-01-01 RX ORDER — HEPARIN SODIUM 1000 [USP'U]/ML
2000 INJECTION, SOLUTION INTRAVENOUS; SUBCUTANEOUS PRN
Status: DISCONTINUED | OUTPATIENT
Start: 2019-01-01 | End: 2019-01-01

## 2019-01-01 RX ORDER — LEVETIRACETAM 5 MG/ML
500 INJECTION INTRAVASCULAR EVERY 12 HOURS
Status: DISCONTINUED | OUTPATIENT
Start: 2019-01-01 | End: 2019-01-01

## 2019-01-01 RX ORDER — CHLORHEXIDINE GLUCONATE 0.12 MG/ML
15 RINSE ORAL 2 TIMES DAILY
Status: DISCONTINUED | OUTPATIENT
Start: 2019-01-01 | End: 2019-01-01

## 2019-01-01 RX ORDER — MAGNESIUM SULFATE 1 G/100ML
1 INJECTION INTRAVENOUS PRN
Status: DISCONTINUED | OUTPATIENT
Start: 2019-01-01 | End: 2019-01-01

## 2019-01-01 RX ORDER — DOPAMINE HYDROCHLORIDE 160 MG/100ML
2.5 INJECTION, SOLUTION INTRAVENOUS CONTINUOUS
Status: DISCONTINUED | OUTPATIENT
Start: 2019-01-01 | End: 2019-01-01

## 2019-01-01 RX ORDER — ATORVASTATIN CALCIUM 40 MG/1
40 TABLET, FILM COATED ORAL DAILY
COMMUNITY

## 2019-01-01 RX ORDER — ACETAMINOPHEN 160 MG
1 TABLET,DISINTEGRATING ORAL 2 TIMES DAILY
COMMUNITY

## 2019-01-01 RX ORDER — LEVETIRACETAM 10 MG/ML
1000 INJECTION INTRAVASCULAR ONCE
Status: COMPLETED | OUTPATIENT
Start: 2019-01-01 | End: 2019-01-01

## 2019-01-01 RX ORDER — DOPAMINE HYDROCHLORIDE 160 MG/100ML
INJECTION, SOLUTION INTRAVENOUS
Status: COMPLETED
Start: 2019-01-01 | End: 2019-01-01

## 2019-01-01 RX ORDER — DEXTROSE MONOHYDRATE 25 G/50ML
25 INJECTION, SOLUTION INTRAVENOUS ONCE
Status: COMPLETED | OUTPATIENT
Start: 2019-01-01 | End: 2019-01-01

## 2019-01-01 RX ORDER — POTASSIUM CHLORIDE 7.45 MG/ML
10 INJECTION INTRAVENOUS PRN
Status: DISCONTINUED | OUTPATIENT
Start: 2019-01-01 | End: 2019-01-01

## 2019-01-01 RX ORDER — MORPHINE SULFATE 4 MG/ML
4 INJECTION, SOLUTION INTRAMUSCULAR; INTRAVENOUS ONCE
Status: COMPLETED | OUTPATIENT
Start: 2019-01-01 | End: 2019-01-01

## 2019-01-01 RX ORDER — FAMOTIDINE 20 MG/1
20 TABLET, FILM COATED ORAL DAILY
Status: DISCONTINUED | OUTPATIENT
Start: 2019-05-03 | End: 2019-01-01

## 2019-01-01 RX ORDER — MORPHINE SULFATE 4 MG/ML
4 INJECTION, SOLUTION INTRAMUSCULAR; INTRAVENOUS
Status: DISCONTINUED | OUTPATIENT
Start: 2019-01-01 | End: 2019-01-01

## 2019-01-01 RX ADMIN — PROPOFOL 10 MCG/KG/MIN: 10 INJECTION, EMULSION INTRAVENOUS at 17:50

## 2019-01-01 RX ADMIN — Medication 2 MCG/MIN: at 10:06

## 2019-01-01 RX ADMIN — ENOXAPARIN SODIUM 40 MG: 40 INJECTION SUBCUTANEOUS at 20:53

## 2019-01-01 RX ADMIN — SODIUM CHLORIDE 1000 ML: 9 INJECTION, SOLUTION INTRAVENOUS at 12:34

## 2019-01-01 RX ADMIN — LEVETIRACETAM 500 MG: 5 INJECTION INTRAVENOUS at 06:59

## 2019-01-01 RX ADMIN — FAMOTIDINE 20 MG: 10 INJECTION, SOLUTION INTRAVENOUS at 08:07

## 2019-01-01 RX ADMIN — HEPARIN SODIUM AND DEXTROSE 6.88 UNITS/KG/HR: 10000; 5 INJECTION INTRAVENOUS at 12:12

## 2019-01-01 RX ADMIN — Medication 9 MG/HR: at 00:21

## 2019-01-01 RX ADMIN — NICARDIPINE HYDROCHLORIDE 2.5 MG/HR: 0.1 INJECTION, SOLUTION INTRAVENOUS at 20:44

## 2019-01-01 RX ADMIN — ASPIRIN 81 MG: 81 TABLET, CHEWABLE ORAL at 08:07

## 2019-01-01 RX ADMIN — Medication 8 MCG/MIN: at 00:21

## 2019-01-01 RX ADMIN — SODIUM CHLORIDE, POTASSIUM CHLORIDE, SODIUM LACTATE AND CALCIUM CHLORIDE 1000 ML: 600; 310; 30; 20 INJECTION, SOLUTION INTRAVENOUS at 08:59

## 2019-01-01 RX ADMIN — VANCOMYCIN HYDROCHLORIDE 2000 MG: 1 INJECTION, POWDER, LYOPHILIZED, FOR SOLUTION INTRAVENOUS at 16:12

## 2019-01-01 RX ADMIN — LORAZEPAM 1 MG: 2 INJECTION INTRAMUSCULAR; INTRAVENOUS at 12:16

## 2019-01-01 RX ADMIN — SODIUM CHLORIDE, POTASSIUM CHLORIDE, SODIUM LACTATE AND CALCIUM CHLORIDE 1000 ML: 600; 310; 30; 20 INJECTION, SOLUTION INTRAVENOUS at 22:06

## 2019-01-01 RX ADMIN — Medication 4 MG/HR: at 19:23

## 2019-01-01 RX ADMIN — DEXTROSE MONOHYDRATE 25 G: 500 INJECTION PARENTERAL at 07:44

## 2019-01-01 RX ADMIN — FAMOTIDINE 20 MG: 10 INJECTION, SOLUTION INTRAVENOUS at 20:53

## 2019-01-01 RX ADMIN — Medication 10 ML: at 08:53

## 2019-01-01 RX ADMIN — CISATRACURIUM BESYLATE 1 MCG/KG/MIN: 10 INJECTION, SOLUTION INTRAVENOUS at 04:35

## 2019-01-01 RX ADMIN — SODIUM CHLORIDE 500 ML: 9 INJECTION, SOLUTION INTRAVENOUS at 17:25

## 2019-01-01 RX ADMIN — CHLORHEXIDINE GLUCONATE 0.12% ORAL RINSE 15 ML: 1.2 LIQUID ORAL at 20:10

## 2019-01-01 RX ADMIN — NICARDIPINE HYDROCHLORIDE 5 MG/HR: 0.1 INJECTION, SOLUTION INTRAVENOUS at 03:08

## 2019-01-01 RX ADMIN — HEPARIN SODIUM 2000 UNITS: 1000 INJECTION INTRAVENOUS; SUBCUTANEOUS at 22:23

## 2019-01-01 RX ADMIN — CHLORHEXIDINE GLUCONATE 0.12% ORAL RINSE 15 ML: 1.2 LIQUID ORAL at 20:36

## 2019-01-01 RX ADMIN — ASPIRIN 81 MG: 81 TABLET, CHEWABLE ORAL at 08:53

## 2019-01-01 RX ADMIN — Medication 10 MG/HR: at 12:23

## 2019-01-01 RX ADMIN — CHLORHEXIDINE GLUCONATE 0.12% ORAL RINSE 15 ML: 1.2 LIQUID ORAL at 08:53

## 2019-01-01 RX ADMIN — NICARDIPINE HYDROCHLORIDE 4 MG/HR: 0.1 INJECTION, SOLUTION INTRAVENOUS at 07:22

## 2019-01-01 RX ADMIN — SODIUM CHLORIDE 1000 ML: 9 INJECTION, SOLUTION INTRAVENOUS at 02:17

## 2019-01-01 RX ADMIN — SODIUM CHLORIDE: 9 INJECTION, SOLUTION INTRAVENOUS at 04:34

## 2019-01-01 RX ADMIN — Medication 10 ML: at 20:10

## 2019-01-01 RX ADMIN — FAMOTIDINE 20 MG: 10 INJECTION, SOLUTION INTRAVENOUS at 07:41

## 2019-01-01 RX ADMIN — CHLORHEXIDINE GLUCONATE 0.12% ORAL RINSE 15 ML: 1.2 LIQUID ORAL at 07:41

## 2019-01-01 RX ADMIN — MORPHINE SULFATE 4 MG: 4 INJECTION INTRAVENOUS at 12:15

## 2019-01-01 RX ADMIN — HEPARIN SODIUM AND DEXTROSE 6.88 UNITS/KG/HR: 10000; 5 INJECTION INTRAVENOUS at 04:42

## 2019-01-01 RX ADMIN — Medication 2 MG/HR: at 12:41

## 2019-01-01 RX ADMIN — FENTANYL CITRATE 50 MCG: 50 INJECTION, SOLUTION INTRAMUSCULAR; INTRAVENOUS at 18:11

## 2019-01-01 RX ADMIN — SODIUM CHLORIDE, POTASSIUM CHLORIDE, SODIUM LACTATE AND CALCIUM CHLORIDE 1000 ML: 600; 310; 30; 20 INJECTION, SOLUTION INTRAVENOUS at 19:26

## 2019-01-01 RX ADMIN — Medication 9 MG/HR: at 09:53

## 2019-01-01 RX ADMIN — Medication 10 ML: at 07:41

## 2019-01-01 RX ADMIN — SODIUM CHLORIDE: 9 INJECTION, SOLUTION INTRAVENOUS at 18:31

## 2019-01-01 RX ADMIN — Medication 10 ML: at 19:57

## 2019-01-01 RX ADMIN — SODIUM CHLORIDE 500 ML: 9 INJECTION, SOLUTION INTRAVENOUS at 07:42

## 2019-01-01 RX ADMIN — NOREPINEPHRINE BITARTRATE 8 MCG/MIN: 1 INJECTION INTRAVENOUS at 12:31

## 2019-01-01 RX ADMIN — HEPARIN SODIUM AND DEXTROSE 6.88 UNITS/KG/HR: 10000; 5 INJECTION INTRAVENOUS at 12:22

## 2019-01-01 RX ADMIN — SODIUM CHLORIDE: 9 INJECTION, SOLUTION INTRAVENOUS at 10:16

## 2019-01-01 RX ADMIN — Medication 4 MG/HR: at 21:07

## 2019-01-01 RX ADMIN — SODIUM CHLORIDE, POTASSIUM CHLORIDE, SODIUM LACTATE AND CALCIUM CHLORIDE 1000 ML: 600; 310; 30; 20 INJECTION, SOLUTION INTRAVENOUS at 01:47

## 2019-01-01 RX ADMIN — LEVETIRACETAM 500 MG: 5 INJECTION INTRAVENOUS at 18:37

## 2019-01-01 RX ADMIN — IOHEXOL 90 ML: 350 INJECTION, SOLUTION INTRAVENOUS at 15:30

## 2019-01-01 RX ADMIN — ENOXAPARIN SODIUM 40 MG: 40 INJECTION SUBCUTANEOUS at 07:40

## 2019-01-01 RX ADMIN — SODIUM CHLORIDE: 9 INJECTION, SOLUTION INTRAVENOUS at 17:50

## 2019-01-01 RX ADMIN — LEVETIRACETAM 500 MG: 5 INJECTION INTRAVENOUS at 06:52

## 2019-01-01 RX ADMIN — CISATRACURIUM BESYLATE 1 MCG/KG/MIN: 10 INJECTION, SOLUTION INTRAVENOUS at 04:27

## 2019-01-01 RX ADMIN — ASPIRIN 81 MG: 81 TABLET, CHEWABLE ORAL at 11:57

## 2019-01-01 RX ADMIN — Medication 10 ML: at 07:47

## 2019-01-01 RX ADMIN — LEVETIRACETAM 500 MG: 5 INJECTION INTRAVENOUS at 21:42

## 2019-01-01 RX ADMIN — FAMOTIDINE 20 MG: 10 INJECTION, SOLUTION INTRAVENOUS at 20:10

## 2019-01-01 RX ADMIN — VECURONIUM BROMIDE 10 MG: 1 INJECTION, POWDER, LYOPHILIZED, FOR SOLUTION INTRAVENOUS at 10:33

## 2019-01-01 RX ADMIN — EPINEPHRINE 4 MCG/MIN: 1 INJECTION INTRAMUSCULAR; INTRAVENOUS; SUBCUTANEOUS at 14:16

## 2019-01-01 RX ADMIN — SODIUM CHLORIDE 1000 ML: 9 INJECTION, SOLUTION INTRAVENOUS at 12:21

## 2019-01-01 RX ADMIN — INSULIN HUMAN 10 UNITS: 100 INJECTION, SOLUTION PARENTERAL at 07:43

## 2019-01-01 RX ADMIN — HEPARIN SODIUM 2000 UNITS: 1000 INJECTION INTRAVENOUS; SUBCUTANEOUS at 10:08

## 2019-01-01 RX ADMIN — FAMOTIDINE 20 MG: 10 INJECTION, SOLUTION INTRAVENOUS at 08:53

## 2019-01-01 RX ADMIN — LEVETIRACETAM 1000 MG: 10 INJECTION INTRAVENOUS at 20:36

## 2019-01-01 RX ADMIN — DOPAMINE HYDROCHLORIDE 2.5 MCG/KG/MIN: 160 INJECTION, SOLUTION INTRAVENOUS at 19:26

## 2019-01-01 RX ADMIN — CISATRACURIUM BESYLATE 2 MCG/KG/MIN: 10 INJECTION, SOLUTION INTRAVENOUS at 18:00

## 2019-01-01 RX ADMIN — SODIUM CHLORIDE: 9 INJECTION, SOLUTION INTRAVENOUS at 03:47

## 2019-01-01 RX ADMIN — HEPARIN SODIUM 4000 UNITS: 1000 INJECTION, SOLUTION INTRAVENOUS; SUBCUTANEOUS at 12:12

## 2019-01-01 RX ADMIN — FAMOTIDINE 20 MG: 10 INJECTION, SOLUTION INTRAVENOUS at 19:57

## 2019-01-01 RX ADMIN — SODIUM CHLORIDE: 9 INJECTION, SOLUTION INTRAVENOUS at 02:18

## 2019-01-01 RX ADMIN — PIPERACILLIN AND TAZOBACTAM 3.38 G: 3; .375 INJECTION, POWDER, FOR SOLUTION INTRAVENOUS at 15:37

## 2019-01-01 ASSESSMENT — PULMONARY FUNCTION TESTS
PIF_VALUE: 18
PIF_VALUE: 21
PIF_VALUE: 18
PIF_VALUE: 21
PIF_VALUE: 18
PIF_VALUE: 19
PIF_VALUE: 21
PIF_VALUE: 24
PIF_VALUE: 18
PIF_VALUE: 22
PIF_VALUE: 18
PIF_VALUE: 20
PIF_VALUE: 19
PIF_VALUE: 20
PIF_VALUE: 18
PIF_VALUE: 21
PIF_VALUE: 21
PIF_VALUE: 20
PIF_VALUE: 23
PIF_VALUE: 19
PIF_VALUE: 24
PIF_VALUE: 18
PIF_VALUE: 21
PIF_VALUE: 26
PIF_VALUE: 22
PIF_VALUE: 22
PIF_VALUE: 21
PIF_VALUE: 19
PIF_VALUE: 18
PIF_VALUE: 6
PIF_VALUE: 18
PIF_VALUE: 22
PIF_VALUE: 23
PIF_VALUE: 18
PIF_VALUE: 19
PIF_VALUE: 20
PIF_VALUE: 21
PIF_VALUE: 18
PIF_VALUE: 21
PIF_VALUE: 18
PIF_VALUE: 30

## 2019-01-01 ASSESSMENT — PAIN SCALES - GENERAL
PAINLEVEL_OUTOF10: 0

## 2019-04-29 PROBLEM — I46.9 CARDIAC ARREST (HCC): Status: ACTIVE | Noted: 2019-01-01

## 2019-04-29 NOTE — ED PROVIDER NOTES
354 (*)     Total Protein 6.0 (*)     Alb 3.2 (*)     GFR Non- 49 (*)     GFR  59 (*)     All other components within normal limits   TROPONIN - Abnormal; Notable for the following components:    Troponin, High Sensitivity 29 (*)     All other components within normal limits   TROPONIN - Abnormal; Notable for the following components:    Troponin, High Sensitivity 50 (*)     All other components within normal limits   MYOGLOBIN, SERUM - Abnormal; Notable for the following components:    Myoglobin 291 (*)     All other components within normal limits   AMMONIA - Abnormal; Notable for the following components:    Ammonia 64 (*)     All other components within normal limits   URINE RT REFLEX TO CULTURE - Abnormal; Notable for the following components:    Glucose, Ur TRACE (*)     Urine Hgb SMALL (*)     Protein, UA 2+ (*)     All other components within normal limits   HGB/HCT - Abnormal; Notable for the following components:    POC Hemoglobin 11.5 (*)     POC Hematocrit 34 (*)     All other components within normal limits   CHLORIDE (POC) - Abnormal; Notable for the following components:    POC Chloride 114 (*)     All other components within normal limits   VENOUS BLOOD GAS, POINT OF CARE - Abnormal; Notable for the following components:    pH, Tobi 7.172 (*)     pCO2, Tobi 60.8 (*)     pO2, Tobi 68.2 (*)     Negative Base Excess, Tobi 7 (*)     O2 Sat, Tobi 87 (*)     All other components within normal limits   CREATININE W/GFR POINT OF CARE - Abnormal; Notable for the following components:    POC Creatinine 1.39 (*)     GFR Comment 59 (*)     GFR Non- 49 (*)     All other components within normal limits   LACTIC ACID,POINT OF CARE - Abnormal; Notable for the following components:    POC Lactic Acid 2.74 (*)     All other components within normal limits   POCT GLUCOSE - Abnormal; Notable for the following components:    POC Glucose 187 (*)     All other components within normal limits   ARTERIAL BLOOD GAS, POC - Abnormal; Notable for the following components:    POC pH 7.298 (*)     POC PO2 236.8 (*)     POC HCO3 17.4 (*)     TCO2 (calc), Art 19 (*)     Negative Base Excess, Art 8 (*)     POC O2  (*)     All other components within normal limits   POCT GLUCOSE - Abnormal; Notable for the following components:    POC Glucose 129 (*)     All other components within normal limits   CULTURE BLOOD #1   URINE CULTURE   HGB/HCT   SODIUM (POC)   POTASSIUM (POC)   CALCIUM, IONIC (POC)   LIPASE   CK   PROTIME-INR   APTT   MICROSCOPIC URINALYSIS   SODIUM (POC)   POTASSIUM (POC)   CALCIUM, IONIC (POC)   URINE DRUG SCREEN   TOX SCR, BLD, ED   HEPATITIS PANEL, ACUTE   HIV SCREEN   TROPONIN   ANION GAP (CALC) POC   POC BLOOD GAS   CREATININE W/GFR POINT OF CARE   LACTIC ACID,POINT OF CARE   ANION GAP (CALC) POC       Ct Head Wo Contrast    Result Date: 4/29/2019  EXAMINATION: CT OF THE HEAD WITHOUT CONTRAST  4/29/2019 12:55 pm TECHNIQUE: CT of the head was performed without the administration of intravenous contrast. Dose modulation, iterative reconstruction, and/or weight based adjustment of the mA/kV was utilized to reduce the radiation dose to as low as reasonably achievable. COMPARISON: 8/25/2006 CT head HISTORY: ORDERING SYSTEM PROVIDED HISTORY: Fall TECHNOLOGIST PROVIDED HISTORY: FINDINGS: BRAIN/VENTRICLES: No acute intracranial hemorrhage. No mass effect. No midline shift. Mild prominence of the ventricles and sulci is consistent with atrophy. Mild periventricular and subcortical white matter hypodensities are nonspecific but likely represent microvascular disease. ORBITS: The visualized portion of the orbits demonstrate no acute abnormality. SINUSES: The visualized paranasal sinuses and mastoid air cells demonstrate no acute abnormality. SOFT TISSUES/SKULL:  No acute abnormality of the visualized skull or soft tissues. Comminuted C1 fracture is incompletely imaged.      No acute intracranial abnormality. Comminuted C1 fracture. Correlate with dedicated CT of the cervical spine. Ct Cervical Spine Wo Contrast    Result Date: 4/29/2019  EXAMINATION: CT OF THE CERVICAL SPINE WITHOUT CONTRAST 4/29/2019 12:55 pm TECHNIQUE: CT of the cervical spine was performed without the administration of intravenous contrast. Multiplanar reformatted images are provided for review. Dose modulation, iterative reconstruction, and/or weight based adjustment of the mA/kV was utilized to reduce the radiation dose to as low as reasonably achievable. COMPARISON: None. HISTORY: ORDERING SYSTEM PROVIDED HISTORY: Fall Initial encounter. Acute cervical spine pain status post fall. FINDINGS: BONES/ALIGNMENT: There is an acute traumatic fracture of the base of the odontoid with approximately 4 mm posterior displacement of the odontoid process with respect to the base of C2. The fracture is comminuted. There are also acute traumatic comminuted fractures of the ring of C1. The anterior ring of C1 is displaced 2 mm. There are fractures of the posterior ring of C1 that are displaced up to 7 mm. The fracture does not extend into the body of C2. The other vertebrae demonstrate normal height. DEGENERATIVE CHANGES: Severe disc degenerative changes and multilevel bilateral facet arthritis noted throughout the cervical spine with severe disc space narrowing, endplate spurring and hypertrophic changes of the facet joints. This contributes to multilevel canal and foraminal narrowing. SOFT TISSUES: There is soft tissue swelling surrounding the comminuted fractures of C1 and C2. Atherosclerotic calcifications noted of the carotid arteries. Endotracheal tube is present. Enteric tube is noted in the esophagus. Acute traumatic fracture of the base of the odontoid with 4 mm of posterior displacement of the dominant fracture fragment of the odontoid process with respect to the base of C2.   Severe comminution of the odontoid process itself. Findings are consistent with a type 2 odontoid process fracture. Severely comminuted fracture of the ring of C1 with displaced fractures of the anterior and posterior ring of C1. Findings suggest a Charbel fracture. Severe multilevel disc and facet degenerative changes. Critical results were called by Dr. Jeison Tello MD to [address] [place] [address] on 4/29/2019 at 15:50. Ct Thoracic Spine Wo Contrast    Result Date: 4/29/2019  EXAMINATION: CT OF THE THORACIC SPINE WITHOUT CONTRAST  4/29/2019 2:33 pm TECHNIQUE: CT of the thoracic spine was performed without the administration of intravenous contrast. Multiplanar reformatted images are provided for review. Dose modulation, iterative reconstruction, and/or weight based adjustment of the mA/kV was utilized to reduce the radiation dose to as low as reasonably achievable. COMPARISON: None HISTORY: ORDERING SYSTEM PROVIDED HISTORY: Fall Initial encounter. Acute thoracic spine pain status post fall. FINDINGS: BONES/ALIGNMENT: The thoracic vertebra demonstrate normal height without evidence of acute compression fracture. There are flowing anterior osteophytes throughout the thoracic spine with disc space narrowing. Findings may represent DISH and or ankylosing spondylitis. There is no definite evidence of fracture of a syndesmophyte. The spinous and transverse processes are intact without evidence of acute fracture. The posterior ribs are intact. DEGENERATIVE CHANGES: Moderate multilevel disc space narrowing throughout the thoracic spine suggesting moderate to severe disc space narrowing. Bilateral facet arthritis is noted. SOFT TISSUES: There is cardiomegaly. Bilateral pleural effusions. There is bibasilar atelectasis with possible consolidation in both lower lobes. Endotracheal tube is present. Enteric tube is present. There is a catheter within the IVC. No paraspinal hematoma.      No evidence of acute traumatic fracture of the thoracic spine. Large anterior syndesmophytes with narrowing of the disc spaces with multilevel disc space narrowing. Findings suggest ankylosing spondyloarthropathy such as discs or ankylosing spondylitis. Multilevel degenerative changes of the spine. Cardiomegaly with bibasilar airspace opacities that may represent consolidations from pneumonia. Ct Lumbar Spine Wo Contrast    Result Date: 4/29/2019  EXAMINATION: CT OF THE LUMBAR SPINE WITHOUT CONTRAST  4/29/2019 TECHNIQUE: CT of the lumbar spine was performed without the administration of intravenous contrast. Multiplanar reformatted images are provided for review. Dose modulation, iterative reconstruction, and/or weight based adjustment of the mA/kV was utilized to reduce the radiation dose to as low as reasonably achievable. COMPARISON: None HISTORY: ORDERING SYSTEM PROVIDED HISTORY: Fall TECHNOLOGIST PROVIDED HISTORY: Fall 10/31/2016 FINDINGS: BONES/ALIGNMENT: Large anterior flowing osteophytes throughout the lumbar spine with disc space narrowing and partial fusion of the disc spaces suggesting an ankylosing spondyloarthropathy such as DISH or ankylosing spondylitis. There is no evidence of an acute compression fracture of the lumbar vertebrae. No definite evidence of fracture of the syndesmophytes. Partial osseous fusion of the SI joints is noted. Transverse processes and spinous processes are intact. Posterior fusion of the spinous processes L2 and L3. DEGENERATIVE CHANGES: Severe multilevel disc and facet degenerative changes with multilevel canal and foraminal narrowing. SOFT TISSUES/RETROPERITONEUM: Atherosclerotic calcifications of the aorta and branch vessels. There is an infrarenal abdominal aortic aneurysm measuring approximately 3.2 x 3.2 cm in size. Limited images of the retroperitoneum demonstrate no acute abnormality. Status post right nephrectomy.      Partial fusion of the disc spaces and large syndesmophytes throughout the lumbar PROVIDED HISTORY: Ordering Physician Provided Reason for Exam: cervical fractures FINDINGS: AORTIC ARCH/ARCH VESSELS: There is a normal branch pattern of the aortic arch. No significant stenosis is seen of the innominate artery or subclavian arteries. CAROTID ARTERIES: The common carotid arteries are normal in appearance without evidence of a flow limiting stenosis. The internal carotid arteries are normal in appearance without evidence of a flow limiting stenosis by NASCET criteria. No dissection or arterial injury is seen. VERTEBRAL ARTERIES: The vertebral arteries both arise from the subclavian arteries and are normal in caliber without evidence of flow limiting stenosis or dissection. SOFT TISSUES:  The lung apices are clear. No cervical or superior mediastinal lymphadenopathy. The visualized portion of the larynx and pharynx appear unremarkable. The parotid, submandibular and thyroid glands demonstrate no acute abnormality. No vascular injury identified. RECENT VITALS:     Temp: 95.9 °F (35.5 °C),  Pulse: 53, Resp: 16, BP: (!) 154/96, SpO2: 99 %    This patient is a 81 y.o. Male with cardiac arrest, in PEA. ROSC obtained. Pt had fallen from chair with C1-2 fx, neurosurgery on board. Pt having myoclonic jerks. Admitted to MICU. Hypothermia protocol on going. Intubated and sedated. Labs showed, elevated trops, and LFTs. Bradycardia in ED, Epi gtt going for hypotension and bradycardia. Trauma also on board. OUTSTANDING TASKS / RECOMMENDATIONS:    1. Awaiting transfer to MICU     FINAL IMPRESSION:     1.  Cardiac arrest [facility])        DISPOSITION:         DISPOSITION:  []  Discharge   []  Transfer -    [x]  Admission - MICU    []  Against Medical Advice   []  Eloped   FOLLOW-UP: Dez Gonzalez [id number]: New Prescriptions    No medications on file           Ruth Andre, [address] [facility]  Emergency Medicine Resident  [facility], [place], [place]  04/29/19 0326

## 2019-04-29 NOTE — PROGRESS NOTES
5779- pt brought up to SICU room 106 via stretcher with ER RN's and RT- pt placed on monitor- vital signs obtained, assessment completed, and RN released ordered. Critical care and NS are okay with pt cooling- RN awaiting for hypothermia orders. [id number]- Family in at bedside- pt's wife, two sons, and a daughter. RN updated on pt's care- explaining cardiac arrest and hypothermia protocol. Family states that they want to keep pt a full code at this time and want to continue on with hypothermia protocol. RN to update critical care and start hypothermia protocol. [address] started and pt started cooling at this time. TOF was 4/4 at 5- nimbex started and BIS score obtained with sedation.

## 2019-04-29 NOTE — PROGRESS NOTES
Pharmacy Note  Vancomycin Consult    Kelli Ortega is a 81 y.o. male started on Vancomycin for pneumonia; consult received from Dr. Kimberly Lombardi to manage therapy. Also receiving the following antibiotics: zosyn. Patient Active Problem List   Diagnosis    Gout    Anemia    GERD (gastroesophageal reflux disease)    Renal cell carcinoma (HCC)    Benign essential HTN    CKD (chronic kidney disease) stage 3, GFR 30-59 ml/min (HCC)    Volume depletion    Bacteremia    Acute bronchitis    Renal artery stenosis (HCC)    Cardiac arrest (HCC)       Allergies:  Patient has no known allergies. Temp max: N/A    Recent Labs     04/29/19  1228   BUN 23       Recent Labs     04/29/19  1228 04/29/19  1355   CREATININE 1.39* 1.10       Recent Labs     04/29/19  1228   WBC 18.3*         Intake/Output Summary (Last 24 hours) at 4/29/2019 1433  Last data filed at 4/29/2019 1309  Gross per 24 hour   Intake 2000 ml   Output --   Net 2000 ml       Culture Date      Source                       Results  4/29                      Blood                         Pending  4/29                      Urine                         Pending    Ht Readings from Last 1 Encounters:   04/29/19 6' 0.05\" (1.83 m)        Wt Readings from Last 1 Encounters:   04/29/19 (!) 310 lb (140.6 kg)         Body mass index is 41.99 kg/m². Estimated Creatinine Clearance: 75 mL/min (based on SCr of 1.1 mg/dL). Goal Trough Level: 15-20 mcg/mL    Assessment/Plan:  Will initiate vancomycin 2000 mg IV once then will give 1750 mg  IV every 24 hours. Timing of trough level will be determined based on culture results, renal function, and clinical response. Thank you for the consult. Will continue to follow. Carlton Aleman, Pharm. D.

## 2019-04-29 NOTE — ED NOTES
Pt intubated with 8.0ett 27cm @ lip By , good color change and bilateral breath sounds noted      Gopal Sanchez RN  04/29/19 2953

## 2019-04-29 NOTE — ED NOTES
Pt to be switched from arnie airway from EMS and to be intubated by .       Ruma Cobian RN  04/29/19 3663

## 2019-04-29 NOTE — ED NOTES
Critical troponin of 50 noted from lab. Dr. Jazzmine Childress notified.       Angie Galdamez, RN  04/29/19 6507

## 2019-04-29 NOTE — FLOWSHEET NOTE
04/29/19 1830   Provider Notification   Reason for Communication Evaluate; Review case  (new consult- HR 30's)   Provider Name Dr. Suzette Lorenzana   Provider Notification Resident   Method of Communication Secure Message   Response Other (Comment)  (see progress note)   Notification Time 31 [phone] [id number]   RN paged cardiology- new consult. RN updated cardiology fellow Dr. Suzette Lorenzana on pt's current status and HR is in mid 30's. Dr. Suzette Lorenzana okay with HR since pt is hypothermia- wants to give atropine 0.5 MG IV PRN is HR sustained under 30 for q 10 minutes. Call if HR is lower than 30. RN to follow up.

## 2019-04-29 NOTE — PROGRESS NOTES
Re-evaluated patient, no s/s of bleeding. No contraindications to hypothermia protocol with trauma or NS teams. Will initiate.

## 2019-04-29 NOTE — ED PROVIDER NOTES
[facility] ED  eMERGENCY dEPARTMENT eNCOUnter   Attending Attestation     Pt Name: Karon Mills  MRN: [id number]  [place] 1935  Date of evaluation: 4/29/19       Karon Mills is a 81 y.o. male who presents with Cardiac Arrest      History: Pt with cardiac arrest and fall. Pt said something to his wife and then fell. Pt found to be in PEA on arrival of EMS. Pt had ROSC after two rounds of EPI. Pressures good enroute. Pt making some movements on his own. Exam: HRRR, lungs CTABL, abdomen soft. Pt moving facial muscles. Pupils responsive. EKG Interpretation    Interpreted by emergency department physician    Rhythm: normal sinus   Rate: normal  Axis: left  Ectopy: none  Conduction: normal  ST Segments: no acute change  T Waves: no acute change  Q Waves: none    Clinical Impression: non-specific EKG, prolonged, QT. Abida Billy M.D. Tube exchanged for 8.0 with CMAC. I was present. First attempt pass. CRITICAL CARE TIME 30 min. I performed a history and physical examination of the patient and discussed management with the resident. I reviewed the residents note and agree with the documented findings and plan of care. Any areas of disagreement are noted on the chart. I was personally present for the key portions of any procedures. I have documented in the chart those procedures where I was not present during the key portions. I have personally reviewed all images and agree with the resident's interpretation. I have reviewed the emergency nurses triage note. I agree with the chief complaint, past medical history, past surgical history, allergies, medications, social and family history as documented unless otherwise noted below. Documentation of the HPI, Physical Exam and Medical Decision Making performed by medical students or scribes is based on my personal performance of the HPI, PE and MDM.  For Phys Assistant/ Nurse Practitioner cases/documentation I have had a face to face evaluation of this patient and have completed at least one if not all key elements of the E/M (history, physical exam, and MDM). Additional findings are as noted. For APC cases I have personally evaluated and examined the patient in conjunction with the APC and agree with the treatment plan and disposition of the patient as recorded by the APC.     Elvia Perez MD  Attending Emergency  Physician        Ramila Nuñez MD  04/29/19 [address] Suhas Walker MD  04/29/19 3388 [facility]  04/29/19 4384

## 2019-04-29 NOTE — PLAN OF CARE
Problem: MECHANICAL VENTILATION  Goal: Patient will maintain patent airway  4/29/2019 1947 by Scarlet Grady RN  Outcome: Ongoing  4/29/2019 1359 by Russell Dumont RCP  Outcome: Ongoing  Goal: Oral health is maintained or improved  4/29/2019 1947 by Scarlet Grady RN  Outcome: Ongoing  4/29/2019 1359 by Russell Dumont RCP  Outcome: Ongoing  Goal: ET tube will be managed safely  4/29/2019 1947 by Scarlet Grady RN  Outcome: Ongoing  4/29/2019 1359 by Russell Dumont RCP  Outcome: Ongoing  Goal: Ability to express needs and understand communication  4/29/2019 1947 by Scarlet Grady RN  Outcome: Ongoing  4/29/2019 1359 by Russell Dumont RCP  Outcome: Ongoing  Goal: Mobility/activity is maintained at optimum level for patient  4/29/2019 1947 by Scarlet Grady RN  Outcome: Ongoing  4/29/2019 1359 by Russell Dumont RCP  Outcome: Ongoing     Problem: Confusion - Acute:  Goal: Absence of continued neurological deterioration signs and symptoms  Description  Absence of continued neurological deterioration signs and symptoms  Outcome: Ongoing  Goal: Mental status will be restored to baseline  Description  Mental status will be restored to baseline  Outcome: Ongoing     Problem: Discharge Planning:  Goal: Ability to perform activities of daily living will improve  Description  Ability to perform activities of daily living will improve  Outcome: Ongoing  Goal: Participates in care planning  Description  Participates in care planning  Outcome: Ongoing     Problem: Injury - Risk of, Physical Injury:  Goal: Absence of physical injury  Description  Absence of physical injury  Outcome: Ongoing  Goal: Will remain free from falls  Description  Will remain free from falls  Outcome: Ongoing     Problem: Mood - Altered:  Goal: Mood stable  Description  Mood stable  Outcome: Ongoing  Goal: Absence of abusive behavior  Description  Absence of abusive behavior  Outcome: Ongoing  Goal: Verbalizations of feeling emotionally comfortable while being cared for will increase  Description  Verbalizations of feeling emotionally comfortable while being cared for will increase  Outcome: Ongoing     Problem: Psychomotor Activity - Altered:  Goal: Absence of psychomotor disturbance signs and symptoms  Description  Absence of psychomotor disturbance signs and symptoms  Outcome: Ongoing     Problem: Sensory Perception - Impaired:  Goal: Demonstrations of improved sensory functioning will increase  Description  Demonstrations of improved sensory functioning will increase  Outcome: Ongoing  Goal: Decrease in sensory misperception frequency  Description  Decrease in sensory misperception frequency  Outcome: Ongoing  Goal: Able to refrain from responding to false sensory perceptions  Description  Able to refrain from responding to false sensory perceptions  Outcome: Ongoing  Goal: Demonstrates accurate environmental perceptions  Description  Demonstrates accurate environmental perceptions  Outcome: Ongoing  Goal: Able to distinguish between reality-based and nonreality-based thinking  Description  Able to distinguish between reality-based and nonreality-based thinking  Outcome: Ongoing  Goal: Able to interrupt nonreality-based thinking  Description  Able to interrupt nonreality-based thinking  Outcome: Ongoing     Problem: Sleep Pattern Disturbance:  Goal: Appears well-rested  Description  Appears well-rested  Outcome: Ongoing     Problem: Falls - Risk of:  Goal: Absence of physical injury  Description  Absence of physical injury  Outcome: Ongoing  Goal: Will remain free from falls  Description  Will remain free from falls  Outcome: Ongoing     Problem: Risk for Impaired Skin Integrity  Goal: Tissue integrity - skin and mucous membranes  Description  Structural intactness and normal physiological function of skin and  mucous membranes.   Outcome: Ongoing

## 2019-04-29 NOTE — ED NOTES
Pt arrived to the ED via EMS for post cardiac arrest. Pt was a witnessed cardiac arrest by wife, on EMS arrival was given two doses or epi prior to arrival with ROSC after 2 rounds of CPR. EMS reported that the initial rhythm check was PEA. Pt had arnie airway in place by EMS prior to arrival. Pt placed on full cardiac monitor. Dr. Osorio Gray at bedside upon arrival. Will continue to monitor.      Magdiel Vicente RN  04/29/19 5556

## 2019-04-29 NOTE — CONSULTS
Department of Neurosurgery                                       Resident Consult Note      Reason for Consult:  C1/c2 fracture  Requesting Physician:  Dr. Patito Chao  Neurosurgeon:   Dr. Yamile Cannon     History Obtained From:  [facility] record    CHIEF COMPLAINT:         Cardiac arrest.    HISTORY OF PRESENT ILLNESS:       The patient is a 81 y.o. male who presents with PMH of CKD, HTN, HLD presenting s/p cardiac arrest. Patient reportedly yelled to his wife and was then found unresponsive with no pulse. CPR was initiated by EMS with [facility] airway placement, 2 rounds of epinephrine and ROSC was obtained. Patient was making involuntary movements after achieving ROSC. On arrival to ED he was hypotensive and bradycardic, started on levophed. The arnie airway was exchanged for ETT. Patient found to have C1/c2 fracture for which neurosurgery was consulted. Per the record, he is not on any blood thinners.      PAST MEDICAL HISTORY :       Past Medical History:        Diagnosis Date    Acute bronchitis     Anemia     Bacteremia     Benign essential HTN     CKD (chronic kidney disease) stage 3, GFR 30-59 ml/min (HCC)     GERD (gastroesophageal reflux disease)     Gout     Renal artery stenosis (HCC)     Renal cell carcinoma (HCC)     UTI (lower urinary tract infection)     Volume depletion        Past Surgical History:        Procedure Laterality Date    TOTAL NEPHRECTOMY      right       Social History:   Social History     Socioeconomic History    Marital status:      Spouse name: Not on file    Number of children: Not on file    Years of education: Not on file    Highest education level: Not on file   Occupational History    Not on file   Social Needs    Financial resource strain: Not on file    Food insecurity:     Worry: Not on file     Inability: Not on file    Transportation needs:     Medical: Not on file     Non-medical: Not on file   Tobacco Use    Smoking status: Current Some Day Smoker     Types: Cigars   Substance and Sexual Activity    Alcohol use: No    Drug use: No    Sexual activity: Not on file   Lifestyle    Physical activity:     Days per week: Not on file     Minutes per session: Not on file    Stress: Not on file   Relationships    Social connections:     Talks on phone: Not on file     Gets together: Not on file     Attends [employer] service: Not on file     Active member of club or organization: Not on file     Attends meetings of clubs or organizations: Not on file     Relationship status: Not on file    Intimate partner violence:     Fear of current or ex partner: Not on file     Emotionally abused: Not on file     Physically abused: Not on file     Forced sexual activity: Not on file   Other Topics Concern    Not on file   Social History Narrative    Not on file       Family History:   History reviewed. No pertinent family history. Allergies:  Patient has no known allergies. Home Medications:  Prior to Admission medications    Medication Sig Start Date End Date Taking? Authorizing Provider   allopurinol (ZYLOPRIM) 100 MG tablet   Take 100 mg by mouth daily  7/16/15  Yes Historical Provider, MD   iron-B12 (Saad Boeck) 70 MG TABS  7/5/15  Yes Historical Provider, MD   oxybutynin (DITROPAN) 5 MG tablet   Take 5 mg by mouth 2 times daily Indications: Patient is only taking this once a day  11/1/14  Yes Historical Provider, MD   FeAsp-FeFum -Suc-C-Thre-B12-FA (MULTIGEN PLUS PO) Take 1 tablet by mouth daily. Yes Historical Provider, MD   carvedilol (COREG) 6.25 MG tablet   Take 12.5 mg by mouth 2 times daily (with meals) Indications: Patient is taking 6.25mg po twice daily    Yes Historical Provider, MD   famotidine (PEPCID) 20 MG tablet Take 20 mg by mouth 2 times daily. Yes Historical Provider, MD   ferrous sulfate 325 (65 FE) MG tablet Take 325 mg by mouth 2 times daily.    Yes Historical Provider, MD   tamsulosin (FLOMAX) 0.4 MG capsule   Take 0.4 mg by mouth 2 times daily    Yes Historical Provider, MD       Current Medications:   Current Facility-Administered Medications: midazolam (VERSED) 100 mg in dextrose 5% 100 mL infusion, 1 mg/hr, Intravenous, Continuous  EPINEPHrine (EPINEPHrine HCL) 5 mg in dextrose 5 % 250 mL infusion, 1 mcg/min, Intravenous, Continuous  norepinephrine (LEVOPHED) 16 mg in dextrose 5 % 250 mL infusion, 2 mcg/min, Intravenous, Continuous  EPINEPHrine 1 MG/ML injection, , ,   dextrose 5 % solution, , ,   piperacillin-tazobactam (ZOSYN) 3.375 g in dextrose 5 % 50 mL IVPB (mini-bag), 3.375 g, Intravenous, Once  vancomycin (VANCOCIN) intermittent dosing (placeholder), , Other, RX Placeholder  vancomycin (VANCOCIN) 2,000 mg in dextrose 5 % 500 mL IVPB, 2,000 mg, Intravenous, Once  [START ON 4/30/2019] vancomycin (VANCOCIN) 1,750 mg in dextrose 5 % 500 mL IVPB, 1,750 mg, Intravenous, Q24H  iohexol (OMNIPAQUE 350) solution 90 mL, 90 mL, Intravenous, ONCE PRN  atropine injection 0.5 mg, 0.5 mg, Intravenous, Once    REVIEW OF SYSTEMS:       Unable to be obtained due to acuity of condition (intubated). Review of systems otherwise negative.     PHYSICAL EXAM:       BP (!) 181/99   Pulse (!) 49   Temp 95.9 °F (35.5 °C) (Core)   Resp 20   Ht 6' 0.05\" (1.83 m)   Wt (!) 310 lb (140.6 kg)   SpO2 100%   BMI 41.99 kg/m²       CONSTITUTIONAL:  Intubated, sedated   HEAD:  No lacerations    EYES:  PERRLA 2mm, + corneal reflexes   ENT:  ETT and OG tube in place, dried blood at nares   NECK:  C-collar in place, no stepoffs or deformities    BACK:  No step-offs or deformities, poor rectal tone   LUNGS:  Diminished bilaterally   CARDIOVASCULAR:  normal s1 / s2   ABDOMEN:  Soft, no rigidity   NEUROLOGIC:  EYE OPENING     Spontaneous - 4 []       To voice - 3 []       To pain - 2 []       None - 1 [x]    VERBAL RESPONSE     Appropriate, oriented - 5 []       Dazed or confused - 4 []       Syllables, expletives - 3 [] changes of the spine. Cardiomegaly with bibasilar airspace opacities that may represent   consolidations from pneumonia. CT Lumbar Spine WO Contrast   Preliminary Result   Partial fusion of the disc spaces and large syndesmophytes throughout the   lumbar spine suggesting an ankylosing spondyloarthropathy such as DISH or   ankylosing spondylitis. No definite evidence of acute fracture. Severe multilevel disc and facet degenerative changes with multilevel canal   and foraminal narrowing. Infrarenal abdominal aortic aneurysm measuring 3.2 cm in diameter. Please   see follow-up recommendations below. RECOMMENDATIONS:   Recommend follow-up every 3 years. Reference: J Vasc Surg 2009 Oct;50(4 Suppl):S2-49. CT Head WO Contrast   Final Result   No acute intracranial abnormality. Comminuted C1 fracture. Correlate with dedicated CT of the cervical spine. CT Cervical Spine WO Contrast   Preliminary Result   Acute traumatic fracture of the base of the odontoid with 4 mm of posterior   displacement of the dominant fracture fragment of the odontoid process with   respect to the base of C2. Severe comminution of the odontoid process   itself. Findings are consistent with a type 2 odontoid process fracture. Severely comminuted fracture of the ring of C1 with displaced fractures of   the anterior and posterior ring of C1. Findings suggest a Charbel fracture. Severe multilevel disc and facet degenerative changes. Critical results were called by Dr. Karen Morel MD to [address] [place] [address]   on 4/29/2019 at 15:50. XR CHEST PORTABLE   Final Result   1. Markedly decreased lung volumes. 2. There is some monitoring leads across the chest.  ETT terminates 2 cm   above paola and presumed NG tube terminates just below GE junction. 3. Probable atelectasis and/or effusion left lung base.          CTA NECK W CONTRAST    (Results Pending)   XR PELVIS (1-2 VIEWS)    (Results Pending)           ASSESSMENT AND PLAN:       Patient Active Problem List   Diagnosis    Gout    Anemia    GERD (gastroesophageal reflux disease)    Renal cell carcinoma (HCC)    Benign essential HTN    CKD (chronic kidney disease) stage 3, GFR 30-59 ml/min (HCC)    Volume depletion    Bacteremia    Acute bronchitis    Renal artery stenosis (HCC)    Cardiac arrest (HCC)         A/P:  This is a 81 y.o. male with severely comminuted fracture of C1 with displacement of anterior and posterior ring and type 2 odontoid process C2 fracture s/p cardiac arrest and ROSC  Patient care will be discussed with attending, will reevaluate patient along with attending     - No neurosurgical interventions planned for now although is a possibility  - CTLS recommendations: C-collar, TLS clear  - HOB: flat   - Obtain MRI brain and cervical spine without contrast once patient is more stable   - Neuro checks per protocol  - Hold all antiplatelets and anticoagulants  - Medical management per primary    Additional recommendations to follow after speaking with family and acquiring records    Please contact neurosurgery with any changes in patients neurologic status. Thank you for your consult.        Tommy Gamble MD   NS pager [phone]  4/29/2019  3:01 PM

## 2019-04-29 NOTE — ED NOTES
140mg succs given per [address] RN  04/29/19 14 [facility] Mayur Wall RN  04/29/19 0602

## 2019-04-29 NOTE — FLOWSHEET NOTE
received call from [facility] that patient was being transferred to [facility] and needed  to escort family to ICU waiting area.  met family in consultation room and escorted them to the waiting area. He pointed out the cafeteria on the way and told them the hours it would be open.  informed unit RN that family was waiting, and a grandson asked if  could find some facial tissues for them, which he did.  provided a ministry of presence as well as compassionate listening.  told them to ask for further support if desired. Mio Morales     04/29/19 1630   Encounter Summary   Services provided to: Family   Referral/Consult From:   [facility]   Support System Spouse; Children;Family members   Place of [facility]   (4/29/19)   Complexity of Encounter High   Length of Encounter 15 minutes   Routine   Type Follow up   Assessment Approachable;Tearful; Anxious; Fearful;Coping;Helplessness   Intervention Active listening;Explored feelings, thoughts, concerns;Nurtured hope;Sustaining presence/ Ministry of presence   Outcome Acceptance;Expressed gratitude;Engaged in conversation;Expressed feelings/needs/concerns;Receptive

## 2019-04-29 NOTE — CONSULTS
home setting) (e.g., apartment, mobile home, single family home): MECHANISM OF INJURY  -Motor Vehicle Collision  Specific vehicle type involved (e.g., sedan, minivan, SUV, pickup truck):   Collision with (e.g., type of vehicle, building, barn, ditch, tree):   -Single Vehicle Collision     -           -Fatality in Same Vehicle            -Passenger:      -[address] Only Restrained   - [address] Only Restrained  - [address] Restrained    -Front Air Bag  -Side Air Bag  -Other Air Bag -Air Bag Not Deployed    -Ejected     -Rollover     -Extricated       CHILD:  -Booster Seat  -Infant Car Seat  - Child Car Seat   -Motorcycle Collision Wearing Helmet     -Yes     -No    -Unknown  -Bicycle Collision Wearing Helmet     -Yes     -No    -Unknown  -Pedestrian Struck      -Fall   From seated position  -Assault  -Gunshot  Specify caliber / type of gun: ____________________________  -Stabbing  Specify weapon type, size: _____________________________  -Burn     -Flame   -Scald   -Electrical   -Chemical           -Contact   -Inhalation   -House fire  -Other ______________________________________________________  -Other protective devices used / worn ___________________________    HISTORY: Patient is an 81year old male who presented to the ED as a cardiac arrest. Patient was reportedly at home with his daughter seated in their kitchen. He called out her name, became unresponsive and fell from his chair to the ground. EMS arrived and started CPR. Tye airway was placed. Patient had ROSC after 2 rounds of epi. Patient is intubated, sedated on versed and on epi drip. Imaging revealed a C1 fx. Loss of Consciousness -No   -Yes Duration(min)    Total Fluids Given Prior To Arrival  cc    MEDICATIONS:   -  None     -  Information not available due to exam limitations documented above  Prior to Admission medications    Medication Sig Start Date End Date Taking? reconstruction, and/or weight based adjustment of the mA/kV was utilized to reduce the radiation dose to as low as reasonably achievable. COMPARISON: 8/25/2006 CT head HISTORY: ORDERING SYSTEM PROVIDED HISTORY: Fall TECHNOLOGIST PROVIDED HISTORY: FINDINGS: BRAIN/VENTRICLES: No acute intracranial hemorrhage. No mass effect. No midline shift. Mild prominence of the ventricles and sulci is consistent with atrophy. Mild periventricular and subcortical white matter hypodensities are nonspecific but likely represent microvascular disease. ORBITS: The visualized portion of the orbits demonstrate no acute abnormality. SINUSES: The visualized paranasal sinuses and mastoid air cells demonstrate no acute abnormality. SOFT TISSUES/SKULL:  No acute abnormality of the visualized skull or soft tissues. Comminuted C1 fracture is incompletely imaged. No acute intracranial abnormality. Comminuted C1 fracture. Correlate with dedicated CT of the cervical spine. Ct Cervical Spine Wo Contrast    Result Date: 4/29/2019  EXAMINATION: CT OF THE CERVICAL SPINE WITHOUT CONTRAST 4/29/2019 12:55 pm TECHNIQUE: CT of the cervical spine was performed without the administration of intravenous contrast. Multiplanar reformatted images are provided for review. Dose modulation, iterative reconstruction, and/or weight based adjustment of the mA/kV was utilized to reduce the radiation dose to as low as reasonably achievable. COMPARISON: None. HISTORY: ORDERING SYSTEM PROVIDED HISTORY: Fall Initial encounter. Acute cervical spine pain status post fall. FINDINGS: BONES/ALIGNMENT: There is an acute traumatic fracture of the base of the odontoid with approximately 4 mm posterior displacement of the odontoid process with respect to the base of C2. The fracture is comminuted. There are also acute traumatic comminuted fractures of the ring of C1. The anterior ring of C1 is displaced 2 mm.   There are fractures of the posterior ring of C1 that compression fracture. There are flowing anterior osteophytes throughout the thoracic spine with disc space narrowing. Findings may represent DISH and or ankylosing spondylitis. There is no definite evidence of fracture of a syndesmophyte. The spinous and transverse processes are intact without evidence of acute fracture. The posterior ribs are intact. DEGENERATIVE CHANGES: Moderate multilevel disc space narrowing throughout the thoracic spine suggesting moderate to severe disc space narrowing. Bilateral facet arthritis is noted. SOFT TISSUES: There is cardiomegaly. Bilateral pleural effusions. There is bibasilar atelectasis with possible consolidation in both lower lobes. Endotracheal tube is present. Enteric tube is present. There is a catheter within the IVC. No paraspinal hematoma. No evidence of acute traumatic fracture of the thoracic spine. Large anterior syndesmophytes with narrowing of the disc spaces with multilevel disc space narrowing. Findings suggest ankylosing spondyloarthropathy such as discs or ankylosing spondylitis. Multilevel degenerative changes of the spine. Cardiomegaly with bibasilar airspace opacities that may represent consolidations from pneumonia. Ct Lumbar Spine Wo Contrast    Result Date: 4/29/2019  EXAMINATION: CT OF THE LUMBAR SPINE WITHOUT CONTRAST  4/29/2019 TECHNIQUE: CT of the lumbar spine was performed without the administration of intravenous contrast. Multiplanar reformatted images are provided for review. Dose modulation, iterative reconstruction, and/or weight based adjustment of the mA/kV was utilized to reduce the radiation dose to as low as reasonably achievable.  COMPARISON: None HISTORY: ORDERING SYSTEM PROVIDED HISTORY: Fall TECHNOLOGIST PROVIDED HISTORY: Fall 10/31/2016 FINDINGS: BONES/ALIGNMENT: Large anterior flowing osteophytes throughout the lumbar spine with disc space narrowing and partial fusion of the disc spaces suggesting an ankylosing lung volumes. 2. There is some monitoring leads across the chest.  ETT terminates 2 cm above paola and presumed NG tube terminates just below GE junction. 3. Probable atelectasis and/or effusion left lung base.        LABS  Labs Reviewed   CHLORIDE (POC) - Abnormal; Notable for the following components:       Result Value    POC Chloride 110 (*)     All other components within normal limits   CBC WITH AUTO DIFFERENTIAL - Abnormal; Notable for the following components:    WBC 18.3 (*)     Seg Neutrophils 70 (*)     Segs Absolute 12.81 (*)     Absolute Mono # 0.92 (*)     All other components within normal limits   COMPREHENSIVE METABOLIC PANEL - Abnormal; Notable for the following components:    Glucose 185 (*)     CREATININE 1.39 (*)     CO2 19 (*)     Alkaline Phosphatase 133 (*)      (*)      (*)     Total Protein 6.0 (*)     Alb 3.2 (*)     GFR Non- 49 (*)     GFR  59 (*)     All other components within normal limits   TROPONIN - Abnormal; Notable for the following components:    Troponin, High Sensitivity 29 (*)     All other components within normal limits   TROPONIN - Abnormal; Notable for the following components:    Troponin, High Sensitivity 50 (*)     All other components within normal limits   MYOGLOBIN, SERUM - Abnormal; Notable for the following components:    Myoglobin 291 (*)     All other components within normal limits   AMMONIA - Abnormal; Notable for the following components:    Ammonia 64 (*)     All other components within normal limits   URINE RT REFLEX TO CULTURE - Abnormal; Notable for the following components:    Glucose, Ur TRACE (*)     Urine Hgb SMALL (*)     Protein, UA 2+ (*)     All other components within normal limits   HGB/HCT - Abnormal; Notable for the following components:    POC Hemoglobin 11.5 (*)     POC Hematocrit 34 (*)     All other components within normal limits   CHLORIDE (POC) - Abnormal; Notable for the following components:    POC Chloride 114 (*)     All other components within normal limits   VENOUS BLOOD GAS, POINT OF CARE - Abnormal; Notable for the following components:    pH, Tobi 7.172 (*)     pCO2, Tobi 60.8 (*)     pO2, Tobi 68.2 (*)     Negative Base Excess, Tobi 7 (*)     O2 Sat, Tobi 87 (*)     All other components within normal limits   CREATININE W/GFR POINT OF CARE - Abnormal; Notable for the following components:    POC Creatinine 1.39 (*)     GFR Comment 59 (*)     GFR Non- 49 (*)     All other components within normal limits   LACTIC ACID,POINT OF CARE - Abnormal; Notable for the following components:    POC Lactic Acid 2.74 (*)     All other components within normal limits   POCT GLUCOSE - Abnormal; Notable for the following components:    POC Glucose 187 (*)     All other components within normal limits   ARTERIAL BLOOD GAS, POC - Abnormal; Notable for the following components:    POC pH 7.298 (*)     POC PO2 236.8 (*)     POC HCO3 17.4 (*)     TCO2 (calc), Art 19 (*)     Negative Base Excess, Art 8 (*)     POC O2  (*)     All other components within normal limits   POCT GLUCOSE - Abnormal; Notable for the following components:    POC Glucose 129 (*)     All other components within normal limits   CULTURE BLOOD #1   URINE CULTURE   HGB/HCT   SODIUM (POC)   POTASSIUM (POC)   CALCIUM, IONIC (POC)   LIPASE   CK   PROTIME-INR   APTT   MICROSCOPIC URINALYSIS   SODIUM (POC)   POTASSIUM (POC)   CALCIUM, IONIC (POC)   URINE DRUG SCREEN   TOX SCR, BLD, ED   HEPATITIS PANEL, ACUTE   HIV SCREEN   TROPONIN   ANION GAP (CALC) POC   POC BLOOD GAS   CREATININE W/GFR POINT OF CARE   LACTIC ACID,POINT OF CARE   ANION GAP (CALC) POC       Cheryl Mendez MD  4/29/19, 4:07 PM              Trauma Attending Attestation      I have reviewed the above GCS note(s) and confirmed the key elements of the medical history and physical exam. I have seen and examined the pt.   I have discussed the findings, established the care plan and recommendations with Resident, GCS RN, bedside nurse.   Tertiary exam   NS following       Adis DO Liz  4/29/2019  5:27 PM

## 2019-04-29 NOTE — FLOWSHEET NOTE
[facility] CARE DEPARTMENT - [address]     Emergency/Trauma Note    PATIENT NAME: Jessica Manzo    Shift date: 4/29/19  Shift day: Monday   Shift # 1    Room # 25/25   Name: Jessica Manzo            Age: 81 y.o. Gender: male          Mosque: [id number] of Church: Unknown    Trauma/Incident type: Stroke Alert  Admit Date & Time: 4/29/2019 12:02 PM  TRAUMA NAME: N/A    PATIENT/EVENT DESCRIPTION:  Jessica Manzo is a 81 y.o. male who arrived via ground transportation from home as a Stroke Alert. Per report pt 'call for his wife, she heard a loud noise and when she look at him he had fallen to the floor. Pt was unresponsive and was revived at a later time. Pt to be admitted to 25/25. SPIRITUAL ASSESSMENT/INTERVENTION:   met with pt's anxious and loving family in ER; escorted his wife Henry Bravo ([phone]), son, daughter, son-in-law and other relatives to a private waiting area.  consulted with pt's nurse to faciliate an update from pt's doctor.  provided a compassionate presence, hospitality,active and empathetic listening,  words of comfort, encouragement and explores spiritual and emotional needs. PATIENT BELONGINGS:  Given to Family    ANY BELONGINGS OF SIGNIFICANT VALUE NOTED:   gave pt's wallet with an unknown amount of cash in it to pt's wife. REGISTRATION STAFF NOTIFIED? No    WHAT IS YOUR SPIRITUAL CARE PLAN FOR THIS PATIENT?:   Chaplains remain available for spiritual or emotional support as needed and may be paged for a specific request visit at any time.     Electronically signed by Burnis Sandifer Resident, on 4/29/2019 at 12:32 PM.  Hunter Garcia  [phone]

## 2019-04-29 NOTE — FLOWSHEET NOTE
04/29/19 1844   Provider Notification   Reason for Communication Evaluate  (stat CT pelvis needed?)   Provider Name Dr. Jackeline Guidry   Provider Notification Resident   Method of Communication Secure Message   Response Waiting for response   Notification Time 1844   stat CT ordered per trauma based on pelvic XR- RN perfectserved Dr. Jackeline Guidry with trauma- trauma service is okay with holding off on CT at this time d/t hypothermia protocol and pt's HR being in the 30's. RN to follow up.

## 2019-04-29 NOTE — ED PROVIDER NOTES
[facility]  Emergency Department Encounter  EmergencyMedicine Resident     Pt Name:Jarrod Sam  MRN: [id number]  [place] 1935  Date of evaluation: 4/29/19  PCP:  Kusum Claros DO    CHIEF COMPLAINT       Chief Complaint   Patient presents with    Cardiac Arrest       HISTORY OF PRESENT ILLNESS  (Location/Symptom, Timing/Onset, Context/Setting, Quality, Duration, Modifying Factors, Severity.)      Mary Figueroa is a 81 y.o. male who presents with post cardiac arrest.  Patient was found to be in PE a arrest, received 2 of epinephrine given by EMS after which [place] was achieved. Patient also was given a [place] airway by EMS as well. Limited history available, per EMS, the patient called out to his wife, and then fell from a chair, hit his head. Patient did not arrive in a cervical collar from EMS as it did not fit around his neck however upon arrival was placed in a [place] cervical collar and while in the hospital cervical precautions were maintained. No other history is available however after talking to family, patient has a history of COPD, remote renal cell carcinoma. No anticoagulation medications per family. PAST MEDICAL / SURGICAL / SOCIAL / FAMILY HISTORY      has a past medical history of Acute bronchitis, Anemia, Bacteremia, Benign essential HTN, CKD (chronic kidney disease) stage 3, GFR 30-59 ml/min ([facility]), GERD (gastroesophageal reflux disease), Gout, Renal artery stenosis ([address] Renal cell carcinoma ([facility] [address] UTI (lower urinary tract infection), and Volume depletion. has a past surgical history that includes total nephrectomy.     Social History     Socioeconomic History    Marital status:      Spouse name: Not on file    Number of children: Not on file    Years of education: Not on file    Highest education level: Not on file   Occupational History    Not on file   Social Needs    Financial resource strain: Not on file    Food insecurity:     Worry: Not on file Inability: Not on file    Transportation needs:     Medical: Not on file     Non-medical: Not on file   Tobacco Use    Smoking status: Current Some Day Smoker     Types: Cigars   Substance and Sexual Activity    Alcohol use: No    Drug use: No    Sexual activity: Not on file   Lifestyle    Physical activity:     Days per week: Not on file     Minutes per session: Not on file    Stress: Not on file   Relationships    Social connections:     Talks on phone: Not on file     Gets together: Not on file     Attends Taoist service: Not on file     Active member of club or organization: Not on file     Attends meetings of clubs or organizations: Not on file     Relationship status: Not on file    Intimate partner violence:     Fear of current or ex partner: Not on file     Emotionally abused: Not on file     Physically abused: Not on file     Forced sexual activity: Not on file   Other Topics Concern    Not on file   Social History Narrative    Not on file       History reviewed. No pertinent family history. Allergies:  Patient has no known allergies. Home Medications:  Prior to Admission medications    Medication Sig Start Date End Date Taking? Authorizing Provider   allopurinol (ZYLOPRIM) 100 MG tablet   Take 100 mg by mouth daily  7/16/15  Yes Historical Provider, MD   iron-B12 (Светлана Cee) 70 MG TABS  7/5/15  Yes Historical Provider, MD   oxybutynin (DITROPAN) 5 MG tablet   Take 5 mg by mouth 2 times daily Indications: Patient is only taking this once a day  11/1/14  Yes Historical Provider, MD   FeAsp-FeFum -Suc-C-Thre-B12-FA (MULTIGEN PLUS PO) Take 1 tablet by mouth daily. Yes Historical Provider, MD   carvedilol (COREG) 6.25 MG tablet   Take 12.5 mg by mouth 2 times daily (with meals) Indications: Patient is taking 6.25mg po twice daily    Yes Historical Provider, MD   famotidine (PEPCID) 20 MG tablet Take 20 mg by mouth 2 times daily.    Yes Historical Provider, MD   ferrous sulfate 325 (65 FE) MG tablet Take 325 mg by mouth 2 times daily. Yes Historical Provider, MD   tamsulosin (FLOMAX) 0.4 MG capsule   Take 0.4 mg by mouth 2 times daily    Yes Historical Provider, MD       REVIEW OF SYSTEMS    (2-9 systems for level 4, 10 or more for level 5)      Review of Systems   Unable to perform ROS: Patient nonverbal       PHYSICAL EXAM   (up to 7 for level 4, 8 or more for level 5)      INITIAL VITALS:   BP (!) 182/103   Pulse 59   Temp 95 °F (35 °C) (Core)   Resp 23   Ht 6' 0.05\" (1.83 m)   Wt (!) 320 lb 5.3 oz (145.3 kg)   SpO2 100%   BMI 43.39 kg/m²     Physical Exam   Constitutional:   Intubated, arrives with arnie airway in place, replaced on arrival   HENT:   Head: Normocephalic and atraumatic. Eyes:   Pinpoint pupils bilaterally, sluggish   Neck:   C collar placed, C spine precautions maintained    Cardiovascular: Regular rhythm and normal heart sounds. Exam reveals no gallop and no friction rub. No murmur heard. Bradycardia   Pulmonary/Chest: Effort normal and breath sounds normal. No respiratory distress. He has no wheezes. He has no rales. Abdominal: Soft. He exhibits no distension. Musculoskeletal: He exhibits no edema. No swelling bilateral lower exts   Neurological:   Intubated and sedated    No gag reflex. Sluggish pupils bilaterally    Reflexive myoclonic jerking movements   Skin: Skin is warm and dry. No erythema.          DIFFERENTIAL  DIAGNOSIS     PLAN (LABS / IMAGING / EKG):  Orders Placed This Encounter   Procedures    INTUBATION    Culture Blood #1    Urine Culture    MRSA DNA Probe, Nasal    XR CHEST PORTABLE    CT Head WO Contrast    CT Cervical Spine WO Contrast    CT Thoracic Spine WO Contrast    CT Lumbar Spine WO Contrast    XR CHEST 1 VW    CTA NECK W CONTRAST    XR PELVIS (1-2 VIEWS)    CT PELVIS WO CONTRAST Additional Contrast? None    Hemoglobin and hematocrit, blood    SODIUM (POC)    POTASSIUM (POC)    CHLORIDE (POC)    CALCIUM, IONIC (POC)  CBC WITH AUTO DIFFERENTIAL    Comprehensive Metabolic Panel    LIPASE    Troponin    CK    MYOGLOBIN, SERUM    AMMONIA    Protime-INR    APTT    Urinalysis Reflex to Culture    Microscopic Urinalysis    Blood gas, arterial    CBC auto differential    Comprehensive Metabolic Panel w/ Reflex to MG    Lactate, Sepsis    Urine Drug Screen    TOX SCR, BLD, ED    Hepatitis Panel, Acute    HIV Screen    Hemoglobin and hematocrit, blood    SODIUM (POC)    POTASSIUM (POC)    CHLORIDE (POC)    CALCIUM, IONIC (POC)    Troponin    Blood gas, arterial    Basic Metabolic Panel    Magnesium    Phosphorus    Calcium, Ionized    Potassium    Blood Gas, Arterial    Lactic Acid, Plasma    APTT    Protime-INR    Lactic Acid, Whole Blood    Diet NPO Effective Now    Tube insert orogastric    Cervical collar    Vital signs per unit routine    Telemetry monitoring    Daily weights    Intake and output    Tobacco cessation education    Place intermittent pneumatic compression device    Up with assistance    Monitor for signs/symptoms of urinary retention    Notify physician    Elevate Head of Bed    Spontaneous Awakening Trial (SAT)    Vital signs: Therapeutic Hypothermia    Notify Physician    Bedrest    Continuous temperature probe    Insert indwelling urinary catheter    Discontinue indwelling urinary catheter when documented indications no longer apply per hospital policy    If no central line inserted contact ordering provider    Intake and output    Neuro checks    RASS assessment cooling, maintenance and re-warming phases.     Target Cooling Temperature    Cooling device    Shivering Instructions    During rewarming phase: Discontinue all potassium containing drugs and fluids 8 hours prior to rewarming    Nursing Communication Potassium - Rewarming    Rewarming instructions    Rewarming phase instruction    During rewarming phase: Do not lighten sedation from RASS-4 until TOF is 4/4    During rewarming phase: After TOF 4/4 goal is RASS-1    Elevate Head of Bed    Place intermittent pneumatic compression device    Full Code    Inpatient consult to Critical Care    Inpatient consult to Critical Care    Inpatient consult to Trauma Surgery    Inpatient consult to Neurosurgery    Pharmacy to Dose: Vancomycin    Inpatient consult to Cardiology    Inpatient consult to Neurology    Initiate Oxygen Therapy Protocol    Respiratory care evaluation only    End Tidal CO2 Continuous    Mechanical ventilation    Post Extubation Oxygen Therapy    Initiate RT Adult Mechanical Ventilation Protocol    Mechanical Ventilation with default initial settings    Oxygen Therapy    Venous Blood Gas, POC    Creatinine W/GFR Point of Care    Lactic Acid, POC    POCT Glucose    Anion Gap (Calc) POC    POC Blood Gas    Arterial Blood Gas, POC    Creatinine W/GFR Point of Care    Lactic Acid, POC    POCT Glucose    Anion Gap (Calc) POC    Arterial Blood Gas, POC    EKG 12 Lead    EKG 12 Lead    Echocardiogram complete 2D with doppler with color    Insert arterial line    PATIENT STATUS (FROM ED OR OR/PROCEDURAL) Inpatient    Restraints non-violent or non-self-destructive       MEDICATIONS ORDERED:  Orders Placed This Encounter   Medications    0.9 % sodium chloride bolus    DISCONTD: midazolam (VERSED) 100 mg in dextrose 5% 100 mL infusion    sodium chloride flush 0.9 % injection 10 mL    sodium chloride flush 0.9 % injection 10 mL    magnesium hydroxide (MILK OF MAGNESIA) 400 MG/5ML suspension 30 mL    ondansetron (ZOFRAN) injection 4 mg    enoxaparin (LOVENOX) injection 40 mg    0.9 % sodium chloride infusion    potassium chloride 10 mEq/100 mL IVPB (Peripheral Line)    magnesium sulfate 1 g in dextrose 5% 100 mL IVPB    famotidine (PEPCID) injection 20 mg    EPINEPHrine (EPINEPHrine HCL) 5 mg in dextrose 5 % 250 mL infusion    DISCONTD: norepinephrine (LEVOPHED) 800 mcg in dextrose 5 % 50 mL syringe    norepinephrine (LEVOPHED) 16 mg in dextrose 5 % 250 mL infusion    EPINEPHrine 1 MG/ML injection     Josh Falk: cabinet override    dextrose 5 % solution     Josh Falk: cabinet override    piperacillin-tazobactam (ZOSYN) 3.375 g in dextrose 5 % 50 mL IVPB (mini-bag)    vancomycin (VANCOCIN) intermittent dosing (placeholder)    vancomycin (VANCOCIN) 2,000 mg in dextrose 5 % 500 mL IVPB    DISCONTD: iohexol (OMNIPAQUE 240) injection 190 mL    vancomycin (VANCOCIN) 1,750 mg in dextrose 5 % 500 mL IVPB    iohexol (OMNIPAQUE 350) solution 90 mL    DISCONTD: atropine injection 1 mg    atropine injection 0.5 mg    ondansetron (ZOFRAN) injection 4 mg    chlorhexidine (PERIDEX) 0.12 % solution 15 mL    DISCONTD: cisatracurium besylate (NIMBEX) 200 mg in sodium chloride 0.9 % 100 mL infusion    DISCONTD: propofol 1000 MG/100ML injection    cisatracurium besylate (NIMBEX) 200 mg in sodium chloride 0.9 % 100 mL infusion    DISCONTD: propofol 1000 MG/100ML injection    DISCONTD: fentaNYL (SUBLIMAZE) injection 50 mcg    fentaNYL (SUBLIMAZE) injection 50 mcg    LORazepam (ATIVAN) injection 1 mg    midazolam (VERSED) 100 mg in dextrose 5% 100 mL infusion    MIDAZOLAM 100 MG IN DEXTROSE 5% 100 ML INFUSION     Ivelisse Urbina: cabinet override    DOPamine (INTROPIN) 400 mg in dextrose 5 % 250 mL infusion    DOPamine (INTROPIN) 1.6-5 MG/ML-% infusion     KAMILAH JACKSON: cabinet override       DDX: Cardiac arrest, C1, C2 fracture, PEA arrest, respiratory vs intracranial hemorhage vs elevated LFTs vs shock liver vs hepatitis vs hepatic encephalopathy    DIAGNOSTIC RESULTS / EMERGENCY DEPARTMENT COURSE / MDM     LABS:  Results for orders placed or performed during the hospital encounter of 04/29/19   Hemoglobin and hematocrit, blood   Result Value Ref Range    POC Hemoglobin 13.8 13.5 - 17.5 g/dL    POC Hematocrit 41 41 - 53 % American 49 (L) >60 mL/min    GFR  59 (L) >60 mL/min    GFR Comment          GFR Staging NOT REPORTED    LIPASE   Result Value Ref Range    Lipase 36 13 - 60 U/L   Troponin   Result Value Ref Range    Troponin, High Sensitivity 29 (H) 0 - 22 ng/L    Troponin T NOT REPORTED <0.03 ng/mL    Troponin Interp NOT REPORTED    Troponin   Result Value Ref Range    Troponin, High Sensitivity 50 (H) 0 - 22 ng/L    Troponin T NOT REPORTED <0.03 ng/mL    Troponin Interp NOT REPORTED    CK   Result Value Ref Range    Total CK 88 39 - 308 U/L   MYOGLOBIN, SERUM   Result Value Ref Range    Myoglobin 291 (H) 28 - 72 ng/mL   AMMONIA   Result Value Ref Range    Ammonia 64 (H) 16 - 60 umol/L   Protime-INR   Result Value Ref Range    Protime 11.2 9.0 - 12.0 sec    INR 1.1    APTT   Result Value Ref Range    PTT 22.7 20.5 - 30.5 sec   Urinalysis Reflex to Culture   Result Value Ref Range    Color, UA YELLOW YELLOW    Turbidity UA CLEAR CLEAR    Glucose, Ur TRACE (A) NEGATIVE    Bilirubin Urine NEGATIVE NEGATIVE    Ketones, Urine NEGATIVE NEGATIVE    Specific Gravity, UA 1.010 1.005 - 1.030    Urine Hgb SMALL (A) NEGATIVE    pH, UA 6.0 5.0 - 8.0    Protein, UA 2+ (A) NEGATIVE    Urobilinogen, Urine Normal Normal    Nitrite, Urine NEGATIVE NEGATIVE    Leukocyte Esterase, Urine NEGATIVE NEGATIVE    Urinalysis Comments NOT REPORTED    Microscopic Urinalysis   Result Value Ref Range    -          WBC, UA 5 TO 10 0 - 5 /HPF    RBC, UA 2 TO 5 0 - 4 /HPF    Casts UA  0 - 8 /LPF    Crystals UA NOT REPORTED None /HPF    Epithelial Cells UA 0 TO 2 0 - 5 /HPF    Renal Epithelial, Urine NOT REPORTED 0 /HPF    Bacteria, UA NOT REPORTED None    Mucus, UA NOT REPORTED None    Trichomonas, UA NOT REPORTED None    Amorphous, UA NOT REPORTED None    Other Observations UA NOT REPORTED NOT REQ.     Yeast, UA NOT REPORTED None   TOX SCR, BLD, ED   Result Value Ref Range    Ethanol <10 <10 mg/dL    Ethanol percent <2.968 <1.551 %    Salicylate Lvl <1 (L) 3 - 10 mg/dL    Acetaminophen Level <5 (L) 10 - 30 ug/mL    Toxic Tricyclic Sc,Blood NEGATIVE NEGATIVE   Hemoglobin and hematocrit, blood   Result Value Ref Range    POC Hemoglobin 11.5 (L) 13.5 - 17.5 g/dL    POC Hematocrit 34 (L) 41 - 53 %   SODIUM (POC)   Result Value Ref Range    POC Sodium 144 138 - 146 mmol/L   POTASSIUM (POC)   Result Value Ref Range    POC Potassium 3.9 3.5 - 4.5 mmol/L   CHLORIDE (POC)   Result Value Ref Range    POC Chloride 114 (H) 98 - 107 mmol/L   CALCIUM, IONIC (POC)   Result Value Ref Range    POC Ionized Calcium 1.24 1.15 - 1.33 mmol/L   Troponin   Result Value Ref Range    Troponin, High Sensitivity 109 (HH) 0 - 22 ng/L    Troponin T NOT REPORTED <0.03 ng/mL    Troponin Interp NOT REPORTED    Basic Metabolic Panel   Result Value Ref Range    Glucose 164 (H) 70 - 99 mg/dL    BUN 22 8 - 23 mg/dL    CREATININE 1.27 (H) 0.70 - 1.20 mg/dL    Bun/Cre Ratio NOT REPORTED 9 - 20    Calcium 8.4 (L) 8.6 - 10.4 mg/dL    Sodium 139 135 - 144 mmol/L    Potassium 3.8 3.7 - 5.3 mmol/L    Chloride 108 (H) 98 - 107 mmol/L    CO2 21 20 - 31 mmol/L    Anion Gap 10 9 - 17 mmol/L    GFR Non-African American 54 (L) >60 mL/min    GFR African American >60 >60 mL/min    GFR Comment          GFR Staging NOT REPORTED    Magnesium   Result Value Ref Range    Magnesium 1.8 1.6 - 2.6 mg/dL   Phosphorus   Result Value Ref Range    Phosphorus 2.6 2.5 - 4.5 mg/dL   Calcium, Ionized   Result Value Ref Range    Calcium, Ion 1.19 1.13 - 1.33 mmol/L   APTT   Result Value Ref Range    PTT 21.9 20.5 - 30.5 sec   Protime-INR   Result Value Ref Range    Protime 10.7 9.0 - 12.0 sec    INR 1.0    Lactic Acid, Whole Blood   Result Value Ref Range    Lactic Acid, Whole Blood 2.3 (H) 0.7 - 2.1 mmol/L   Venous Blood Gas, POC   Result Value Ref Range    pH, Tobi 7.172 (LL) 7.320 - 7.430    pCO2, Tobi 60.8 (H) 41.0 - 51.0 mm Hg    pO2, Tobi 68.2 (H) 30.0 - 50.0 mm Hg    HCO3, Venous 22.3 22.0 - 29.0 mmol/L    Total CO2, Venous 24 23.0 - 30.0 mmol/L    Negative Base Excess, Tobi 7 (H) 0.0 - 2.0    Positive Base Excess, Tobi NOT REPORTED 0.0 - 3.0    O2 Sat, Tobi 87 (H) 60.0 - 85.0 %    O2 Device/Flow/% NOT REPORTED     Yoseph Test NOT REPORTED     Sample Site NOT REPORTED     Mode NOT REPORTED     FIO2 NOT REPORTED     Pt Temp NOT REPORTED     POC pH Temp NOT REPORTED     POC pCO2 Temp NOT REPORTED mm Hg    POC pO2 Temp NOT REPORTED mm Hg   Creatinine W/GFR Point of Care   Result Value Ref Range    POC Creatinine 1.39 (H) 0.51 - 1.19 mg/dL    GFR Comment 59 (L) >60 mL/min    GFR Non- 49 (L) >60 mL/min    GFR Comment         Lactic Acid, POC   Result Value Ref Range    POC Lactic Acid 2.74 (H) 0.56 - 1.39 mmol/L   POCT Glucose   Result Value Ref Range    POC Glucose 187 (H) 74 - 100 mg/dL   Anion Gap (Calc) POC   Result Value Ref Range    Anion Gap 10 7 - 16 mmol/L   Arterial Blood Gas, POC   Result Value Ref Range    POC pH 7.298 (L) 7.350 - 7.450    POC pCO2 35.5 35.0 - 48.0 mm Hg    POC PO2 236.8 (H) 83.0 - 108.0 mm Hg    POC HCO3 17.4 (L) 21.0 - 28.0 mmol/L    TCO2 (calc), Art 19 (L) 22.0 - 29.0 mmol/L    Negative Base Excess, Art 8 (H) 0.0 - 2.0    Positive Base Excess, Art NOT REPORTED 0.0 - 3.0    POC O2  (H) 94.0 - 98.0 %    O2 Device/Flow/% Adult Ventilator     Yoseph Test POSITIVE     Sample Site Left Radial Artery     Mode NOT REPORTED     FIO2 100.0     Pt Temp NOT REPORTED     POC pH Temp NOT REPORTED     POC pCO2 Temp NOT REPORTED mm Hg    POC pO2 Temp NOT REPORTED mm Hg   Creatinine W/GFR Point of Care   Result Value Ref Range    POC Creatinine 1.10 0.51 - 1.19 mg/dL    GFR Comment >60 >60 mL/min    GFR Non-African American >60 >60 mL/min    GFR Comment         Lactic Acid, POC   Result Value Ref Range    POC Lactic Acid 1.36 0.56 - 1.39 mmol/L   POCT Glucose   Result Value Ref Range    POC Glucose 129 (H) 74 - 100 mg/dL   Anion Gap (Calc) POC   Result Value Ref Range    Anion Gap 13 7 - 16 mmol/L Comminuted C1 fracture. Correlate with dedicated CT of the cervical spine. Ct Cervical Spine Wo Contrast    Result Date: 4/29/2019  EXAMINATION: CT OF THE CERVICAL SPINE WITHOUT CONTRAST 4/29/2019 12:55 pm TECHNIQUE: CT of the cervical spine was performed without the administration of intravenous contrast. Multiplanar reformatted images are provided for review. Dose modulation, iterative reconstruction, and/or weight based adjustment of the mA/kV was utilized to reduce the radiation dose to as low as reasonably achievable. COMPARISON: None. HISTORY: ORDERING SYSTEM PROVIDED HISTORY: Fall Initial encounter. Acute cervical spine pain status post fall. FINDINGS: BONES/ALIGNMENT: There is an acute traumatic fracture of the base of the odontoid with approximately 4 mm posterior displacement of the odontoid process with respect to the base of C2. The fracture is comminuted. There are also acute traumatic comminuted fractures of the ring of C1. The anterior ring of C1 is displaced 2 mm. There are fractures of the posterior ring of C1 that are displaced up to 7 mm. The fracture does not extend into the body of C2. The other vertebrae demonstrate normal height. DEGENERATIVE CHANGES: Severe disc degenerative changes and multilevel bilateral facet arthritis noted throughout the cervical spine with severe disc space narrowing, endplate spurring and hypertrophic changes of the facet joints. This contributes to multilevel canal and foraminal narrowing. SOFT TISSUES: There is soft tissue swelling surrounding the comminuted fractures of C1 and C2. Atherosclerotic calcifications noted of the carotid arteries. Endotracheal tube is present. Enteric tube is noted in the esophagus. Acute traumatic fracture of the base of the odontoid with 4 mm of posterior displacement of the dominant fracture fragment of the odontoid process with respect to the base of C2. Severe comminution of the odontoid process itself. Findings are consistent with a type 2 odontoid process fracture. Severely comminuted fracture of the ring of C1 with displaced fractures of the anterior and posterior ring of C1. Findings suggest a Charbel fracture. Severe multilevel disc and facet degenerative changes. Critical results were called by Dr. Ashley Pierce MD to [address] [place] [address] on 4/29/2019 at 15:50. Ct Thoracic Spine Wo Contrast    Result Date: 4/29/2019  EXAMINATION: CT OF THE THORACIC SPINE WITHOUT CONTRAST  4/29/2019 2:33 pm TECHNIQUE: CT of the thoracic spine was performed without the administration of intravenous contrast. Multiplanar reformatted images are provided for review. Dose modulation, iterative reconstruction, and/or weight based adjustment of the mA/kV was utilized to reduce the radiation dose to as low as reasonably achievable. COMPARISON: None HISTORY: ORDERING SYSTEM PROVIDED HISTORY: Fall Initial encounter. Acute thoracic spine pain status post fall. FINDINGS: BONES/ALIGNMENT: The thoracic vertebra demonstrate normal height without evidence of acute compression fracture. There are flowing anterior osteophytes throughout the thoracic spine with disc space narrowing. Findings may represent DISH and or ankylosing spondylitis. There is no definite evidence of fracture of a syndesmophyte. The spinous and transverse processes are intact without evidence of acute fracture. The posterior ribs are intact. DEGENERATIVE CHANGES: Moderate multilevel disc space narrowing throughout the thoracic spine suggesting moderate to severe disc space narrowing. Bilateral facet arthritis is noted. SOFT TISSUES: There is cardiomegaly. Bilateral pleural effusions. There is bibasilar atelectasis with possible consolidation in both lower lobes. Endotracheal tube is present. Enteric tube is present. There is a catheter within the IVC. No paraspinal hematoma. No evidence of acute traumatic fracture of the thoracic spine.  Large anterior syndesmophytes with narrowing of the disc spaces with multilevel disc space narrowing. Findings suggest ankylosing spondyloarthropathy such as discs or ankylosing spondylitis. Multilevel degenerative changes of the spine. Cardiomegaly with bibasilar airspace opacities that may represent consolidations from pneumonia. Ct Lumbar Spine Wo Contrast    Result Date: 4/29/2019  EXAMINATION: CT OF THE LUMBAR SPINE WITHOUT CONTRAST  4/29/2019 TECHNIQUE: CT of the lumbar spine was performed without the administration of intravenous contrast. Multiplanar reformatted images are provided for review. Dose modulation, iterative reconstruction, and/or weight based adjustment of the mA/kV was utilized to reduce the radiation dose to as low as reasonably achievable. COMPARISON: None HISTORY: ORDERING SYSTEM PROVIDED HISTORY: Fall TECHNOLOGIST PROVIDED HISTORY: Fall 10/31/2016 FINDINGS: BONES/ALIGNMENT: Large anterior flowing osteophytes throughout the lumbar spine with disc space narrowing and partial fusion of the disc spaces suggesting an ankylosing spondyloarthropathy such as DISH or ankylosing spondylitis. There is no evidence of an acute compression fracture of the lumbar vertebrae. No definite evidence of fracture of the syndesmophytes. Partial osseous fusion of the SI joints is noted. Transverse processes and spinous processes are intact. Posterior fusion of the spinous processes L2 and L3. DEGENERATIVE CHANGES: Severe multilevel disc and facet degenerative changes with multilevel canal and foraminal narrowing. SOFT TISSUES/RETROPERITONEUM: Atherosclerotic calcifications of the aorta and branch vessels. There is an infrarenal abdominal aortic aneurysm measuring approximately 3.2 x 3.2 cm in size. Limited images of the retroperitoneum demonstrate no acute abnormality. Status post right nephrectomy.      Partial fusion of the disc spaces and large syndesmophytes throughout the lumbar spine suggesting an ankylosing spondyloarthropathy such as DISH or ankylosing spondylitis. No definite evidence of acute fracture. Severe multilevel disc and facet degenerative changes with multilevel canal and foraminal narrowing. Infrarenal abdominal aortic aneurysm measuring 3.2 cm in diameter. Please see follow-up recommendations below. RECOMMENDATIONS: Recommend follow-up every 3 years. Reference: J Vasc Surg 2009 Oct;50(4 Suppl):S2-49. Xr Chest Portable    Result Date: 4/29/2019  EXAMINATION: SINGLE XRAY VIEW OF THE CHEST 4/29/2019 12:34 pm COMPARISON: 12/16/2006 HISTORY: ORDERING SYSTEM PROVIDED HISTORY: Post arrest TECHNOLOGIST PROVIDED HISTORY: Post arrest Ordering Physician Provided Reason for Exam: supine portable, post arrest FINDINGS: Patient is imaged on a backboard. ETT terminates 2 cm above paola. Presumed NG tube courses into the abdomen region terminating close to the GE junction in the gastric fundus. Markedly decreased lung volumes. Probable left basal atelectasis and effusion. Pneumothorax. Bones grossly intact. 1. Markedly decreased lung volumes. 2. There is some monitoring leads across the chest.  ETT terminates 2 cm above paola and presumed NG tube terminates just below GE junction. 3. Probable atelectasis and/or effusion left lung base. Cta Neck W Contrast    Result Date: 4/29/2019  EXAMINATION: CTA OF THE NECK 4/29/2019 2:33 pm TECHNIQUE: CTA of the neck was performed with the administration of intravenous contrast. Multiplanar reformatted images are provided for review. MIP images are provided for review. Stenosis of the internal carotid arteries measured using NASCET criteria. Dose modulation, iterative reconstruction, and/or weight based adjustment of the mA/kV was utilized to reduce the radiation dose to as low as reasonably achievable. COMPARISON: None.  HISTORY: ORDERING SYSTEM PROVIDED HISTORY: Concern for vascular injury with cervical fracture TECHNOLOGIST PROVIDED HISTORY: Ordering Physician questions answered. PROCEDURES:    Intubation  Date/Time: 4/29/2019 7:35 PM  Performed by: Edi Wilson MD  Authorized by: Tavon Altamirano MD     Consent:     Consent obtained:  Emergent situation  Pre-procedure details:     Patient status:  Unresponsive    Paralytics:  Succinylcholine  Procedure details:     Preoxygenation: Nasal cannula and arnie airway with 100% oxygen. CPR in progress: no      Intubation method:  Oral    Oral intubation technique:  Video-assisted    Laryngoscope blade: Mac 4    Tube size (mm):  8.0    Tube type:  Cuffed    Number of attempts:  1    Ventilation between attempts: no      Cricoid pressure: no      Tube visualized through cords: yes    Placement assessment:     ETT to lip:  27    Tube secured with:  ETT mccabe    Breath sounds:  Equal    Placement verification: chest rise      CXR findings:  ETT in proper place  Post-procedure details:     Patient tolerance of procedure: Tolerated well, no immediate complications        Quattro Placement Procedure Note    Indication: Vascular access and cooling protocol    Consent: Unable to be obtained due to the emergent nature of this procedure. Procedure: The patient was positioned appropriately and the skin over the right femoral vein was prepped with chlorhexidine. Local anesthesia was not performed due to the emergent nature of this procedure. A large bore needle was used to identify the vein. A guide wire was then inserted into the vein through the needle. A triple lumen catheter was then inserted into the vessel over the guide wire using the Seldinger technique. All ports showed good, free flowing blood return and were flushed with saline solution. The catheter was then securely fastened to the skin with sutures and covered with a sterile dressing. A post procedure X-ray was not indicated. The patient tolerated the procedure well.     Complications: None      CONSULTS:  IP CONSULT TO CRITICAL CARE  IP CONSULT TO CRITICAL CARE  IP CONSULT TO TRAUMA SURGERY  IP CONSULT TO NEUROSURGERY  PHARMACY TO DOSE VANCOMYCIN  IP CONSULT TO CARDIOLOGY  IP CONSULT TO NEUROLOGY    CRITICAL CARE:    30 minutes    FINAL IMPRESSION      1. Cardiac arrest ([facility] [address]    2. Closed Charbel fracture, initial encounter ([facility] [address]    3. Other closed displaced odontoid fracture, initial encounter ([facility] [address]    4.  Increased ammonia level          DISPOSITION / PLAN     DISPOSITION Admitted 04/29/2019 01:06:43 PM      PATIENT REFERRED TO:  DO Nura Gallardo [facility], [place]  [phone]            DISCHARGE MEDICATIONS:  Current Discharge Medication List          Michelle Flores MD  Emergency Medicine Resident    (Please note that portions of thisnote were completed with a voice recognition program.  Efforts were made to edit the dictations but occasionally words are mis-transcribed.)       Michelle Flores MD  04/29/19 [id number]

## 2019-04-29 NOTE — PROGRESS NOTES
Trauma Tertiary Survey    Admit Date: 4/29/2019  Hospital day 0    Fall out of chair       Past Medical History:   Diagnosis Date    Acute bronchitis     Anemia     Bacteremia     Benign essential HTN     CKD (chronic kidney disease) stage 3, GFR 30-59 ml/min (HCC)     GERD (gastroesophageal reflux disease)     Gout     Renal artery stenosis (HCC)     Renal cell carcinoma (HCC)     UTI (lower urinary tract infection)     Volume depletion        Scheduled Meds:   sodium chloride flush  10 mL Intravenous 2 times per day    enoxaparin  40 mg Subcutaneous Daily    famotidine (PEPCID) injection  20 mg Intravenous BID    EPINEPHrine        vancomycin (VANCOCIN) intermittent dosing (placeholder)   Other RX Placeholder    [START ON 4/30/2019] vancomycin  1,750 mg Intravenous Q24H    atropine  0.5 mg Intravenous Once    chlorhexidine  15 mL Mouth/Throat BID    LORazepam  1 mg Intravenous Once     Continuous Infusions:   sodium chloride 100 mL/hr at 04/29/19 1750    EPINEPHrine infusion Stopped (04/29/19 1539)    norepinephrine Stopped (04/29/19 1416)    dextrose      cisatracurium (NIMBEX) infusion 2 mcg/kg/min (04/29/19 1800)    propofol 10 mcg/kg/min (04/29/19 1750)     PRN Meds:sodium chloride flush, magnesium hydroxide, ondansetron, potassium chloride, magnesium sulfate, ondansetron    Subjective:     Patient is sedated. Exam very limited.      Objective:     Patient Vitals for the past 8 hrs:   BP Temp Temp src Pulse Resp SpO2 Height Weight   04/29/19 1800 (!) 182/103 -- -- 59 23 100 % -- --   04/29/19 1751 -- -- -- -- -- -- -- (!) 320 lb 5.3 oz (145.3 kg)   04/29/19 1642 -- -- -- 77 16 100 % -- --   04/29/19 1559 (!) 154/96 -- -- 53 16 99 % -- --   04/29/19 1553 (!) 169/86 -- -- 56 16 100 % -- --   04/29/19 1549 (!) 183/156 -- -- 51 17 100 % -- --   04/29/19 1547 (!) 196/105 -- -- 54 17 100 % -- --   04/29/19 1543 (!) 208/110 -- -- (!) 48 17 100 % -- --   04/29/19 1538 (!) 204/95 -- -- 53 18 100 % -- --   04/29/19 1455 -- -- -- (!) 43 -- -- -- --   04/29/19 1444 (!) 181/99 -- -- (!) 49 -- 100 % -- --   04/29/19 1441 -- 95.9 °F (35.5 °C) CORE -- -- -- -- --   04/29/19 1438 (!) 180/88 -- -- (!) 46 20 100 % -- --   04/29/19 1431 (!) 181/86 -- -- (!) 43 18 100 % -- --   04/29/19 1430 -- -- -- -- -- -- 6' 0.05\" (1.83 m) --   04/29/19 1426 (!) 183/87 -- -- (!) 44 16 100 % -- --   04/29/19 1422 (!) 160/86 -- -- (!) 42 16 100 % -- --   04/29/19 1419 (!) 154/75 -- -- (!) 44 16 100 % -- --   04/29/19 1416 (!) 158/81 -- -- (!) 43 16 100 % -- --   04/29/19 1414 (!) 153/80 -- -- (!) 44 16 100 % -- --   04/29/19 1410 (!) 152/74 -- -- (!) 43 16 100 % -- --   04/29/19 1407 (!) 148/77 -- -- (!) 43 16 100 % -- --   04/29/19 1404 (!) 143/73 -- -- (!) 44 16 100 % -- --   04/29/19 1401 (!) 144/73 -- -- (!) 43 16 100 % -- --   04/29/19 1358 138/74 -- -- (!) 45 16 100 % -- --   04/29/19 1355 134/72 -- -- (!) 43 16 100 % -- --   04/29/19 1352 137/69 -- -- (!) 44 16 100 % -- --   04/29/19 1349 (!) 148/76 -- -- (!) 44 16 100 % -- --   04/29/19 1346 (!) 149/78 -- -- (!) 44 16 100 % -- --   04/29/19 1343 (!) 154/76 -- -- (!) 45 16 100 % -- --   04/29/19 1340 (!) 150/75 -- -- (!) 45 16 100 % -- --   04/29/19 1338 (!) 155/78 -- -- (!) 45 16 100 % -- --   04/29/19 1334 (!) 158/77 -- -- (!) 47 16 100 % -- --   04/29/19 1331 (!) 151/79 -- -- (!) 43 16 100 % -- --   04/29/19 1328 (!) 152/78 -- -- (!) 44 16 100 % -- --   04/29/19 1325 (!) 143/77 -- -- (!) 44 16 100 % -- --   04/29/19 1322 138/75 -- -- (!) 45 16 100 % -- --   04/29/19 1319 135/71 -- -- (!) 45 16 100 % -- --   04/29/19 1317 132/67 -- -- (!) 46 16 100 % -- --   04/29/19 1314 (!) 143/73 -- -- 51 18 100 % -- --   04/29/19 1311 (!) 147/70 -- -- (!) 47 16 100 % -- --   04/29/19 1308 (!) 149/75 -- -- 50 -- 100 % -- --   04/29/19 1305 (!) 151/78 -- -- 67 -- 100 % -- --   04/29/19 1222 108/72 -- -- 61 14 95 % -- --   04/29/19 1220 -- -- -- 70 23 94 % -- --   04/29/19 1219 108/72 -- -- 73 23 93 % -- --   04/29/19 1216 114/77 -- -- 64 16 95 % -- --   04/29/19 1210 120/80 -- -- 65 22 95 % -- --   04/29/19 1208 -- -- -- -- -- 95 % -- --   04/29/19 1207 133/83 -- -- 70 19 -- -- (!) 310 lb (140.6 kg)       I/O last 3 completed shifts: In: 2000 [I.V.:2000]  Out: -   No intake/output data recorded.     Radiology:    PHYSICAL EXAM:   GCS:  1 - Does not open eyes   1 - No motor response to pain  1 - Makes no noise    Pupil size:  Left 1 mm Right 1 mm  Pupil reaction: No  Wiggles fingers: Left No Right No  Hand grasp:   Left decreased   Right decreased  Wiggles toes: Left No    Right No  Plantar flexion: Left decreased  Right decreased    Patient is intubated and sedated, exam extremely limited    Spine:     Spine Tenderness ROM   Cervical Unable to assess Normal Passive   Thoracic Unable to assess Unable to assess   Lumbar Unable to assess Unable to assess     Musculoskeletal    Joint Tenderness Swelling ROM   Right shoulder Unable to assess absent Unable to assess   Left shoulder Unable to assess absent Unable to assess   Right elbow Unable to assess absent Unable to assess   Left elbow Unable to assess absent Unable to assess   Right wrist Unable to assess absent Unable to assess   Left wrist Unable to assess absent Unable to assess   Right hand grasp Unable to assess absent Unable to assess   Left hand grasp Unable to assess absent Unable to assess   Right hip Unable to assess absent Unable to assess   Left hip Unable to assess absent Unable to assess   Right knee Unable to assess absent Unable to assess   Left knee Unable to assess absent Unable to assess   Right ankle Unable to assess absent Unable to assess   Left ankle Unable to assess absent Unable to assess   Right foot Unable to assess absent Unable to assess   Left foot Unable to assess absent Unable to assess           CONSULTS: NS    PROCEDURES:     INJURIES:      Patient Active Problem List   Diagnosis    Gout    Anemia    GERD (gastroesophageal reflux disease)    Renal cell carcinoma (HCC)    Benign essential HTN    CKD (chronic kidney disease) stage 3, GFR 30-59 ml/min (HCC)    Volume depletion    Bacteremia    Acute bronchitis    Renal artery stenosis (HCC)    Cardiac arrest (HCC)         Assessment/Plan:       1. Tertiary exam unable to be completed due to patients condition. Please inform us when patient is no longer sedated, so we may reassess.      Dr. Jorge Sousa, Emergency Medicine PGY 1  4/29/2019

## 2019-04-29 NOTE — H&P
History:        Procedure Laterality Date    TOTAL NEPHRECTOMY      right       Allergies:    No Known Allergies      Home Medications:   Prior to Admission medications    Medication Sig Start Date End Date Taking? Authorizing Provider   allopurinol (ZYLOPRIM) 100 MG tablet   Take 100 mg by mouth daily  7/16/15  Yes Historical Provider, MD   iron-B12 (Berniece Quinn) 70 MG TABS  7/5/15  Yes Historical Provider, MD   oxybutynin (DITROPAN) 5 MG tablet   Take 5 mg by mouth 2 times daily Indications: Patient is only taking this once a day  11/1/14  Yes Historical Provider, MD Stokes-FeFum -Suc-C-Thre-B12-FA (MULTIGEN PLUS PO) Take 1 tablet by mouth daily. Yes Historical Provider, MD   carvedilol (COREG) 6.25 MG tablet   Take 12.5 mg by mouth 2 times daily (with meals) Indications: Patient is taking 6.25mg po twice daily    Yes Historical Provider, MD   famotidine (PEPCID) 20 MG tablet Take 20 mg by mouth 2 times daily. Yes Historical Provider, MD   ferrous sulfate 325 (65 FE) MG tablet Take 325 mg by mouth 2 times daily. Yes Historical Provider, MD   tamsulosin (FLOMAX) 0.4 MG capsule   Take 0.4 mg by mouth 2 times daily    Yes Historical Provider, MD       Social History:   TOBACCO:   reports that he has been smoking cigars. He does not have any smokeless tobacco history on file. ETOH:   reports that he does not drink alcohol. DRUGS:  reports that he does not use drugs. OCCUPATION:      Family History:   History reviewed. No pertinent family history. REVIEW OF SYSTEMS (ROS):  Unable to obtain as patient is intubated      Physical Exam:    Vitals: BP (!) 181/99   Pulse (!) 49   Temp 95.9 °F (35.5 °C) (Core)   Resp 20   Ht 6' 0.05\" (1.83 m)   Wt (!) 310 lb (140.6 kg)   SpO2 100%   BMI 41.99 kg/m²     Body weight:   Wt Readings from Last 3 Encounters:   04/29/19 (!) 310 lb (140.6 kg)   08/04/15 (!) 310 lb (140.6 kg)   11/21/14 (!) 313 lb 12.8 oz (142.3 kg)       Body Mass Index :  Body mass index is 41.99 kg/m². PHYSICAL EXAMINATION :  Constitutional: intubated, on versed drip, morbidly obese, lifepack on patient  EENT: PERRLA (2 mm bilaterally), sclera clear, anicteric, ET tube and OG tube in place, there is dried blood at the nares  Neck: C collar in place, not manipulated 2/2 known C spine fx  Respiratory: clear to auscultation, no wheezes or rales and unlabored breathing. No crepitus noted  Cardiovascular: bradycardic (high 40s low 50s on monitor in room) rate and rhythm, normal S1, S2, no murmur noted and 2+ pulses throughout  Abdomen: large pannus, soft, nontender, nondistended, no masses or organomegaly  Neurological: intubated, on versed drip  Extremities:  peripheral pulses normal, no pedal edema, no clubbing or cyanosis      Laboratory findings:-    CBC:   Recent Labs     04/29/19  1228   WBC 18.3*   HGB 13.1        BMP:    Recent Labs     04/29/19  1215 04/29/19  1228 04/29/19  1355   NA  --  137  --    K  --  4.4  --    CL  --  107  --    CO2  --  19*  --    BUN  --  23  --    CREATININE 1.39* 1.39* 1.10   GLUCOSE  --  185*  --      S. Calcium:  Recent Labs     04/29/19  1228   CALCIUM 8.6     S. Ionized Calcium:No results for input(s): IONCA in the last 72 hours. S. Magnesium:No results for input(s): MG in the last 72 hours. S. Phosphorus:No results for input(s): PHOS in the last 72 hours. S. Glucose:  Recent Labs     04/29/19  1215 04/29/19  1355   POCGLU 187* 129*     Glycosylated hemoglobin A1C: No results for input(s): LABA1C in the last 72 hours. INR:   Recent Labs     04/29/19  1228   INR 1.1     Hepatic functions:   Recent Labs     04/29/19  1228   ALKPHOS 133*   *   *   PROT 6.0*   BILITOT 0.36   LABALBU 3.2*     Pancreatic functions:No results for input(s): LACTA, AMYLASE in the last 72 hours. S. Lactic Acid: No results for input(s): LACTA in the last 72 hours.   Cardiac enzymes:  Recent Labs     04/29/19  1228   CKTOTAL 88     BNP:No results for input(s): BNP in the last 72 hours. Lipid profile: No results for input(s): CHOL, TRIG, HDL, LDLCALC in the last 72 hours. Invalid input(s): LDL  Blood Gases: No results found for: PH, PCO2, PO2, HCO3, O2SAT  Thyroid functions: No results found for: TSH     Urinalysis:     Microbiology:  Cultures during this admission:     Blood cultures:                 [] None drawn      [x] Negative             []  Positive (Details:  )  Urine Culture:                   [] None drawn      [x] Negative             []  Positive (Details:  )  Sputum Culture:               [x] None drawn       [] Negative             []  Positive (Details:  )   Endotracheal aspirate:     [x] None drawn       [] Negative             []  Positive (Details:  )         -----------------------------------------------------------------  Radiological reports:    CXR:    ETT terminates 2 cm above paola.  Presumed NG tube courses into the abdomen   region terminating close to the GE junction in the gastric fundus.       Markedly decreased lung volumes.  Probable left basal atelectasis and   effusion.  Pneumothorax.  Bones grossly intact         Electrocardiogram: nonspecific EKG per ED team    Last Echocardiogram findings: none available in system         Assessment and Plan     Patient Active Problem List   Diagnosis    Gout    Anemia    GERD (gastroesophageal reflux disease)    Renal cell carcinoma (HCC)    Benign essential HTN    CKD (chronic kidney disease) stage 3, GFR 30-59 ml/min ([facility])    Volume depletion    Bacteremia    Acute bronchitis    Renal artery stenosis ([facility] [address]    Cardiac arrest ([facility] [address]         NEUROLOGIC: Post-cardiac arrest, sedated on versed. Making some movements prior to sedation per ED team. Head CT performed and negative.   CT C-spine with comminuted C1 fracture, CTAs, CT of thoracic and lumbar pending  -Continue Versed while intubated  -f/u CT results  -Trauma and NS consulted, f/u recs  -Given some movement post arrest, will hold off on hypothermia protocol    CARDIOVASCULAR: s/p PEA arrest, 2 rounds of epi, ROSC achieved PTA, bradycardic in ED, elevated trops. Hypotensive in ED, started on levophed, currently titrating down  -f/u repeat trops  -will d/w cardiology    PULMONARY: Intubated, respiratory acidosis on initial ABG with hypercarbia, likely also obesity hypoventilation syndrome  -f/u CXR  -repeat ABG     RENAL/FLUID/ELECTROLYTE: CKD, solitary kidney after renal cell carcinoma with right sided nephrectomy  -Daily BMP  -    GI/NUTRITION: NPO   NUTRITION:  No diet orders on file  -will start tube feeds once scans back if no surgical interventions started  -pepcid BID until feeds started  -    ID: Afebrile on arrival, leukocytosis noted, no evidence of infxn, septic workup in ED completed, received vanc and zosyn in ED  -hold off on abx at this time  -monitor fever curve  -daily CBC    HEMATOLOGIC: hgb 13  -Daily CBC      OTHER: Fall, found down.  Per history, seems more syncopal as opposed to mechanical  -f/u trauma imaging     PROPHYLAXIS:   Stress ulcer: H2 blocker   VTE: SCDs    DISPOSITION:   Continue ICU    Gui Jones MD  Internal Medicine PGY2   4/29/2019, 3:01 PM

## 2019-04-30 PROBLEM — G25.3 MYOCLONIC JERKING: Status: ACTIVE | Noted: 2019-01-01

## 2019-04-30 PROBLEM — R00.1 BRADYCARDIA: Status: ACTIVE | Noted: 2019-01-01

## 2019-04-30 NOTE — PROGRESS NOTES
Critical Care Team - Daily Progress Note      Date and time: 2019 9:44 AM  Patient's name:  Sara Aguiar Record Number: [id number]  Patient's account/billing number: [de-identified]  Patient's YOB: 1935  Age: 81 y.o. Date of Admission: 2019 12:02 PM  Length of stay during current admission: 1      Primary Care Physician: Marisa Horan DO  ICU Attending Physician: Dr. Camacho Moeller Status: Full Code    Reason for ICU admission: cardiac arrest      SUBJECTIVE:     OVERNIGHT EVENTS:       Mentation: still on hypothermia protocol, BP improved with Cardene drip     Fever:-  Temp (24hrs), Av.8 °F (33.8 °C), Min:91.4 °F (33 °C), Max:95.9 °F (35.5 °C)    Secretions: small    Blood pressure: elevated,   Systolic (57HYW), HRH:878 , Min:76 , W     Diastolic (56OCN), UPP:22, Min:41, Max:103        Urine output in the last 24 hours:  In: 2333.1 [I.V.:2333.1]  Out: 0160 [Urine:1235]  Net since admission: +2.898 L  Patient Vitals for the past 96 hrs (Last 3 readings):   Weight   19 1751 (!) 320 lb 5.3 oz (145.3 kg)   19 1207 (!) 310 lb (140.6 kg)             AWAKE & FOLLOWING COMMANDS:  [x] No   [] Yes    CURRENT VENTILATION STATUS:     [x] Ventilator  [] BIPAP  [] Nasal Cannula [] Room Air        SECRETIONS Amount:  [x] Small [] Moderate  [] Large  [] None  Color:     [] White [] Colored  [] Bloody    SEDATION:  RAAS Score:  [] Propofol gtt  [x] Versed gtt  [] Ativan gtt   [] No Sedation    PARALYZED:  [] No    [] Yes    DIARRHEA:                [x] No                [] Yes  (C. Difficile status: [] positive                                                                                                                       [] negative                                                                                                                     [] pending)    VASOPRESSORS:  [x] No    [] Yes    If yes -   [] Levophed       [] Dopamine     [] Vasopressin       [] Dobutamine  [] ear normal  Nose - no mucosal drainage  Mouth - ET tube in place  Neck - intubated  Chest - ventilated breath sounds  Heart - S1 and S2 normal, bradycardic  Abdomen - soft, nontender, nondistended, no masses or organomegaly  Neurological - sedated, paralyzed}  Extremities - no pedal edema noted, intact peripheral pulses  Skin - normal coloration and turgor, no rashes, no suspicious skin lesions noted      Any additional physical findings:    MEDICATIONS:    Scheduled Meds:   sodium chloride flush  10 mL Intravenous 2 times per day    enoxaparin  40 mg Subcutaneous Daily    famotidine (PEPCID) injection  20 mg Intravenous BID    vancomycin (VANCOCIN) intermittent dosing (placeholder)   Other RX Placeholder    vancomycin  1,750 mg Intravenous Q24H    chlorhexidine  15 mL Mouth/Throat BID     Continuous Infusions:   sodium chloride 100 mL/hr at 04/30/19 0434    norepinephrine Stopped (04/29/19 1416)    cisatracurium (NIMBEX) infusion 1 mcg/kg/min (04/30/19 0729)    midazolam 4 mg/hr (04/30/19 0939)    DOPamine 2.5 mcg/kg/min (04/29/19 1926)    niCARdipine 1 mg/hr (04/30/19 0930)     PRN Meds:     sodium chloride flush 10 mL PRN   magnesium hydroxide 30 mL Daily PRN   ondansetron 4 mg Q6H PRN   potassium chloride 10 mEq PRN   magnesium sulfate 1 g PRN   ondansetron 4 mg Q6H PRN         VENT SETTINGS (Comprehensive) (if applicable):  Vent Information  $Ventilation: $Subsequent Day  Ventilator Started: Yes  Equipment Changed: HME  Vent Type: Servo i  Vent Mode: PRVC  Vt Ordered: 550 mL  Rate Set: 16 bmp  FiO2 : 45 %  Sensitivity: 5  PEEP/CPAP: 5  I Time/ I Time %: 0.9 s  Cuff Pressure (cm H2O): 22 cm H2O  Humidification Source: E  Additional Respiratory  Assessments  Pulse: (!) 46  Resp: 16  SpO2: 99 %  End Tidal CO2: 26 (%)  Position: Semi-Tao's  Humidification Source: E  Oral Care Completed?: Yes  Oral Care: Mouth swabbed, Mouth suctioned  Subglottic Suction Done?: Yes  Cuff Pressure (cm H2O): 22 cm problems. *  ·   ·         PLAN:     WEAN PER PROTOCOL:  [] No   [] Yes  [] N/A    DISCONTINUE ANY LABS:   [] No   [] Yes    ICU PROPHYLAXIS:  Stress ulcer:  [] PPI Agent  [] [id number] [] Sucralfate  [] Other:  VTE:   [] Enoxaparin  [] Unfract. Heparin Subcut  [] EPC Cuffs    NUTRITION:  [] NPO [] Tube Feeding (Specify: ) [] TPN  [] PO (Diet: Diet NPO Effective Now)    HOME MEDICATIONS RECONCILED: [] No  [] Yes    INSULIN DRIP:   [] No   [] Yes    CONSULTATION NEEDED:  [] No   [] Yes    FAMILY UPDATED:    [] No   [] Yes    TRANSFER OUT OF ICU:   [] No   [] Yes      Fluids:NS  Feeding:NPO  Analgesics: Morphne   Sedation: versed  DVT Prophylaxis:  lovenox  Mobilization: N/A  Head Up: yes  GI Prophylaxis:  Pepcid  Glycemic Control: N/A  Spontaneous breathing trial: N/S  Bowel management: MOM   Indwelling catheter: Murray, ET Tube, central line    ADDITIONAL PLAN:    1. Cardiac arrest with PEA. Hypothermia protocol. Nimbex, Versed, follow up EKG, currentlyat goal temperature, will keep for 24 hours before rewarming. Cardiology recs appreciated. 2. History of myoclonic jerks in ED. Will consult neurology after hypothermia protocol  3. Leukocytosis resolving, follow up cultures, hold on IV antibiotics, acidosis improving  4. Anemia. Mildly low, monitor at this time, 13.1-12.5  5. C1 fracture. Nuerosurgery recs appricated. Will follow up MRI brain and spine after hypothermia protocol  6. Follow up CT pelvis when tolerating, will re-coonsult Trauma  7. SHALA improving. SASCHA Rubio M.D. Critical care resident,  Department of Internal Medicine/ Critical care  [facility]             4/30/2019, 9:44 AM  Attending Physician Statement  I have discussed the case of Karon Mills, including pertinent history and exam findings with the resident/fellow/NP/PA. I have seen and examined the patient and the key elements of the encounter have been performed by me.   I agree with the assessment, plan and orders as documented by the resident/fellow/NP/PA  With changes made to the note as needed. Pt was seen during rounds. Review of Systems:   In addition to the pertinent positives and negatives as stated within HPI and the review of systems as documented in their notes, all other systems were reviewed when able to and are reported negative. Remains on neuromuscular blockade. We'll change Lovenox to 30 mg twice a day for DVT prophylaxis. On Pepcid. Chest x-ray with bilateral atelectasis. On hypothermia protocol. On nicardipine for hypertension. On maintenance of 1%. Creatinine is 1.06. Continue to monitor and  Blood sugars are acceptable. Patient is on pain medications. ABG with respiratory and metabolic acidosis. Anemia, slightly worse. Continue to monitor. Urine output is decreased. Continue to hydrate patient. Family was updated. Continue ventilator support  Total critical care time caring for this patient with life threatening, unstable organ failure, including direct patient contact, management of life support systems, review of data including imaging and labs, discussions with other team members and physicians at least 27  Min so far today, excluding procedures.       Tavon Guest  4/30/2019  2:44 PM

## 2019-04-30 NOTE — PROGRESS NOTES
Nutrition Assessment    Type and Reason for Visit: Initial(Day 1 protocol)    Nutrition Recommendations:   -Continue NPO status  -Start nutrition support vs po diet as medically able  -If tube feeding is desired, recommend Vital High Protein @ goal rate of 60 mL/hr x 24 hrs ~>1440 kcals, 126 gms protein, 1204 mLs water  -Will monitor nutrition progression. Nutrition Assessment:  Pt nutritionally compromised aeb NPO status and intubated. Pt at risk for further comprise d/t cardiac arrest. Pt on hypothermia protocol. No plans for nutrition at this time - if tube feeding is desired see rec's above. Malnutrition Assessment:  · Malnutrition Status: Insufficient data  · Context: Acute illness or injury  · Findings of the 6 clinical characteristics of malnutrition (Minimum of 2 out of 6 clinical characteristics is required to make the diagnosis of moderate or severe Protein Calorie Malnutrition based on AND/ASPEN Guidelines):  1. Energy Intake-Unable to assess,      2. Weight Loss-Unable to assess,    3. Fat Loss-Unable to assess,    4. Muscle Loss- ,  Unable to assess  5. Fluid Accumulation-Mild fluid accumulation, Extremities, Generalized  6.   Strength-Not measured    Nutrition Risk Level: High    Nutrient Needs:  · Estimated Daily Total Kcal: 8-11 kcal/kg ~>7186-9544 kcals/d  · Estimated Daily Protein (g): 2.0-2.5 gm/kg ~>162-206 gms/d     Nutrition Diagnosis:   · Problem: Inadequate oral intake  · Etiology: related to Impaired respiratory function-inability to consume food, Insufficient energy/nutrient consumption     Signs and symptoms:  as evidenced by NPO status due to medical condition, Intubation    Objective Information:  · Nutrition-Focused Physical Findings: hypoactive bowel sounds  · Wound Type: None  · Current Nutrition Therapies:  · Oral Diet Orders: NPO   · Anthropometric Measures:  · Ht: 6' 0.05\" (183 cm)   · Current Body Wt: 320 lb (145.2 kg)  · Admission Body Wt: 320 lb (145.2 kg)  · %

## 2019-04-30 NOTE — CONSULTS
@[email or web address]@      Neurology Consult Note          CHIEF COMPLAINT:  Cardiac arrest     History Obtained From:  patient, electronic medical record       HISTORY OF PRESENT ILLNESS:              The patient is a 81 y.o. male with significant past medical history of HTN, COPD, renal cell carcinoma , HTN was found down with a PEA arrest. CPR was  initiated, EMS intubated with Jasvir Payette airway, had 2 rounds of epi and CPR, and ROSC was achieved. Patient was making involuntary movements after achieving ROSC. No known history of IV drug abuse. Pt was hypotensive and bradycardiac on admission which improved with pressor support. Workup remarkable for SHALA, comminuted C1 fracture for which CTAs were ordered, trauma and NS  Were consulted. Trauma surgery signed off after recommending non urgent CT pelvis. Neurosurgery recommended MRI brain and spine. Pt was also found to be in acute respiratory failure with hypoxemia and hypercapnia secondary to cardiac arrest.ECG on admission showed SR, LVH, prolonged QTc and no acute ST changes. At present pt is on hypothermia protocol, rewarming will start today in evening. Neurology was consulted for myoclonic jerks pt has in ED. CXR was done which showed b/L atelectasis. PT has leukocytosis trending down.       Past Medical History:        Diagnosis Date    Acute bronchitis     Anemia     Bacteremia     Benign essential HTN     CKD (chronic kidney disease) stage 3, GFR 30-59 ml/min (HCC)     GERD (gastroesophageal reflux disease)     Gout     Renal artery stenosis (HCC)     Renal cell carcinoma (HCC)     UTI (lower urinary tract infection)     Volume depletion      Past Surgical History:        Procedure Laterality Date    TOTAL NEPHRECTOMY      right     Current Medications:   Current Facility-Administered Medications: amLODIPine (NORVASC) tablet 5 mg, 5 mg, Oral, Daily  morphine (PF) injection 2 mg, 2 mg, Intravenous, Q2H PRN **OR** morphine injection 4 mg, 4 mg, Intravenous, Q2H PRN  aspirin chewable tablet 81 mg, 81 mg, Oral, Daily  heparin (porcine) injection 4,000 Units, 4,000 Units, Intravenous, PRN  heparin (porcine) injection 2,000 Units, 2,000 Units, Intravenous, PRN  heparin 25,000 units in dextrose 5% 250 mL infusion, 6.88 Units/kg/hr, Intravenous, Continuous  0.9 % sodium chloride bolus, 500 mL, Intravenous, Once  sodium chloride flush 0.9 % injection 10 mL, 10 mL, Intravenous, 2 times per day  sodium chloride flush 0.9 % injection 10 mL, 10 mL, Intravenous, PRN  magnesium hydroxide (MILK OF MAGNESIA) 400 MG/5ML suspension 30 mL, 30 mL, Oral, Daily PRN  ondansetron (ZOFRAN) injection 4 mg, 4 mg, Intravenous, Q6H PRN  0.9 % sodium chloride infusion, , Intravenous, Continuous  potassium chloride 10 mEq/100 mL IVPB (Peripheral Line), 10 mEq, Intravenous, PRN  magnesium sulfate 1 g in dextrose 5% 100 mL IVPB, 1 g, Intravenous, PRN  famotidine (PEPCID) injection 20 mg, 20 mg, Intravenous, BID  ondansetron (ZOFRAN) injection 4 mg, 4 mg, Intravenous, Q6H PRN  chlorhexidine (PERIDEX) 0.12 % solution 15 mL, 15 mL, Mouth/Throat, BID  cisatracurium besylate (NIMBEX) 200 mg in sodium chloride 0.9 % 100 mL infusion, 2 mcg/kg/min, Intravenous, Continuous  midazolam (VERSED) 100 mg in dextrose 5% 100 mL infusion, 1 mg/hr, Intravenous, Continuous  niCARdipine (CARDENE) 20 mg in 0.9 % sodium chloride 200 mL infusion, 5 mg/hr, Intravenous, Continuous  Allergies:  Patient has no known allergies. Social History:  TOBACCO:   reports that he has been smoking cigars. He does not have any smokeless tobacco history on file. ETOH:   reports that he does not drink alcohol. DRUGS:   reports that he does not use drugs. ACTIVITIES OF DAILY LIVING:    I  Family History:   History reviewed. No pertinent family history.     REVIEW OF SYSTEMS:  Review of Systems  Cannot be obtained as pt intubated     PHYSICAL EXAM:  Vitals:  /64   Pulse 55   Temp (!) 91.4 °F (33 °C) (Esophageal) Resp 16   Ht 6' 0.05\" (1.83 m)   Wt (!) 320 lb 5.3 oz (145.3 kg)   SpO2 100%   BMI 43.39 kg/m²    Physical Exam   General appearance - intubated, sedated   Mental status - intubated sedated  Eyes - sclera anicteric  Ears - right ear normal, left ear normal  Nose - no mucosal drainage  Mouth - ET tube in place  Neck - intubated  Chest - ventilated breath sounds  Heart - S1 and S2 normal, bradycardic  Abdomen - soft, nontender, nondistended, no masses or organomegaly  Neurological - sedated, paralyzed  Extremities - no pedal edema noted, intact peripheral pulses  Skin - normal coloration and turgor, no rashes, no suspicious skin lesions noted            DATA  Recent Results (from the past 24 hour(s))   Arterial Blood Gas, POC    Collection Time: 04/29/19  5:16 PM   Result Value Ref Range    POC pH 7.275 (L) 7.350 - 7.450    POC pCO2 49.0 (H) 35.0 - 48.0 mm Hg    POC PO2 94.9 83.0 - 108.0 mm Hg    POC HCO3 22.8 21.0 - 28.0 mmol/L    TCO2 (calc), Art 24 22.0 - 29.0 mmol/L    Negative Base Excess, Art 4 (H) 0.0 - 2.0    Positive Base Excess, Art NOT REPORTED 0.0 - 3.0    POC O2 SAT 96 94.0 - 98.0 %    O2 Device/Flow/% Adult Ventilator     Yoseph Test POSITIVE     Sample Site Left Radial Artery     Mode PRVC     FIO2 50.0     Pt Temp NOT REPORTED     POC pH Temp NOT REPORTED     POC pCO2 Temp NOT REPORTED mm Hg    POC pO2 Temp NOT REPORTED mm Hg   Hepatitis Panel, Acute    Collection Time: 04/29/19  5:57 PM   Result Value Ref Range    Hepatitis B Surface Ag NONREACTIVE NONREACTIVE    Hepatitis C Ab NONREACTIVE NONREACTIVE    Hep B Core Ab, IgM NONREACTIVE NONREACTIVE    Hep A IgM NONREACTIVE NONREACTIVE   HIV Screen    Collection Time: 04/29/19  5:57 PM   Result Value Ref Range    HIV Ag/Ab NONREACTIVE NONREACTIVE   Basic Metabolic Panel    Collection Time: 04/29/19  5:57 PM   Result Value Ref Range    Glucose 164 (H) 70 - 99 mg/dL    BUN 22 8 - 23 mg/dL    CREATININE 1.27 (H) 0.70 - 1.20 mg/dL    Bun/Cre Ratio NOT REPORTED 9 - 20    Calcium 8.4 (L) 8.6 - 10.4 mg/dL    Sodium 139 135 - 144 mmol/L    Potassium 3.8 3.7 - 5.3 mmol/L    Chloride 108 (H) 98 - 107 mmol/L    CO2 21 20 - 31 mmol/L    Anion Gap 10 9 - 17 mmol/L    GFR Non-African American 54 (L) >60 mL/min    GFR African American >60 >60 mL/min    GFR Comment          GFR Staging NOT REPORTED    Magnesium    Collection Time: 04/29/19  5:57 PM   Result Value Ref Range    Magnesium 1.8 1.6 - 2.6 mg/dL   Phosphorus    Collection Time: 04/29/19  5:57 PM   Result Value Ref Range    Phosphorus 2.6 2.5 - 4.5 mg/dL   Calcium, Ionized    Collection Time: 04/29/19  5:57 PM   Result Value Ref Range    Calcium, Ion 1.19 1.13 - 1.33 mmol/L   APTT    Collection Time: 04/29/19  5:57 PM   Result Value Ref Range    PTT 21.9 20.5 - 30.5 sec   Protime-INR    Collection Time: 04/29/19  5:57 PM   Result Value Ref Range    Protime 10.7 9.0 - 12.0 sec    INR 1.0    Lactic Acid, Whole Blood    Collection Time: 04/29/19  5:57 PM   Result Value Ref Range    Lactic Acid, Whole Blood 2.3 (H) 0.7 - 2.1 mmol/L   MRSA DNA Probe, Nasal    Collection Time: 04/29/19  6:58 PM   Result Value Ref Range    Specimen Description . NASAL SWAB     MRSA, DNA, Nasal  NEGATIVE:  MRSA DNA not detected by nucleic acid amplificati     NEGATIVE:  MRSA DNA not detected by nucleic acid amplification.    EKG 12 Lead    Collection Time: 04/29/19  8:26 PM   Result Value Ref Range    Ventricular Rate 49 BPM    Atrial Rate 49 BPM    P-R Interval 178 ms    QRS Duration 118 ms    Q-T Interval 580 ms    QTc Calculation (Bazett) 523 ms    P Axis 52 degrees    R Axis 3 degrees    T Axis 54 degrees   Arterial Blood Gas, POC    Collection Time: 04/29/19 11:18 PM   Result Value Ref Range    POC pH 7.337 (L) 7.350 - 7.450    POC pCO2 41.8 35.0 - 48.0 mm Hg    POC PO2 74.2 (L) 83.0 - 108.0 mm Hg    POC HCO3 22.4 21.0 - 28.0 mmol/L    TCO2 (calc), Art 24 22.0 - 29.0 mmol/L    Negative Base Excess, Art 3 (H) 0.0 - 2.0    Positive Base Excess, Art NOT REPORTED 0.0 - 3.0    POC O2 SAT 94 94.0 - 98.0 %    O2 Device/Flow/% Adult Ventilator     Yoseph Test NOT APPLICABLE     Sample Site Arterial Line     Mode NOT REPORTED     FIO2 50.0     Pt Temp NOT REPORTED     POC pH Temp NOT REPORTED     POC pCO2 Temp NOT REPORTED mm Hg    POC pO2 Temp NOT REPORTED mm Hg   Calcium, Ionized    Collection Time: 04/29/19 11:22 PM   Result Value Ref Range    Calcium, Ion 1.15 1.13 - 1.33 mmol/L   Lactic Acid, Plasma    Collection Time: 04/29/19 11:22 PM   Result Value Ref Range    Lactic Acid NOT REPORTED mmol/L    Lactic Acid, Whole Blood 1.4 0.7 - 2.1 mmol/L   Magnesium    Collection Time: 04/29/19 11:22 PM   Result Value Ref Range    Magnesium 1.8 1.6 - 2.6 mg/dL   Phosphorus    Collection Time: 04/29/19 11:22 PM   Result Value Ref Range    Phosphorus 2.7 2.5 - 4.5 mg/dL   Calcium, Ionized    Collection Time: 04/30/19  5:18 AM   Result Value Ref Range    Calcium, Ion 1.15 1.13 - 1.33 mmol/L   CBC auto differential    Collection Time: 04/30/19  5:18 AM   Result Value Ref Range    WBC 9.3 3.5 - 11.3 k/uL    RBC 4.10 (L) 4.21 - 5.77 m/uL    Hemoglobin 12.5 (L) 13.0 - 17.0 g/dL    Hematocrit 38.6 (L) 40.7 - 50.3 %    MCV 94.1 82.6 - 102.9 fL    MCH 30.5 25.2 - 33.5 pg    MCHC 32.4 28.4 - 34.8 g/dL    RDW 14.2 11.8 - 14.4 %    Platelets See Reflexed IPF Result 138 - 453 k/uL    MPV NOT REPORTED 8.1 - 13.5 fL    NRBC Automated 0.0 0.0 per 100 WBC    Differential Type NOT REPORTED     WBC Morphology NOT REPORTED     RBC Morphology NOT REPORTED     Platelet Estimate NOT REPORTED     Seg Neutrophils 73 (H) 36 - 65 %    Lymphocytes 13 (L) 24 - 43 %    Monocytes 12 3 - 12 %    Eosinophils % 1 1 - 4 %    Basophils 0 0 - 2 %    Immature Granulocytes 1 (H) 0 %    Segs Absolute 6.74 1.50 - 8.10 k/uL    Absolute Lymph # 1.17 1.10 - 3.70 k/uL    Absolute Mono # 1.13 0.10 - 1.20 k/uL    Absolute Eos # 0.12 0.00 - 0.44 k/uL    Basophils # 0.03 0.00 - 0.20 k/uL    Absolute Immature (L) 40.7 - 50.3 %    MCV 93.7 82.6 - 102.9 fL    MCH 30.9 25.2 - 33.5 pg    MCHC 33.0 28.4 - 34.8 g/dL    RDW 14.0 11.8 - 14.4 %    Platelets See Reflexed IPF Result 138 - 453 k/uL    MPV NOT REPORTED 8.1 - 13.5 fL    NRBC Automated 0.0 0.0 per 100 WBC   APTT    Collection Time: 04/30/19 11:37 AM   Result Value Ref Range    PTT 30.1 20.5 - 30.5 sec   Immature Platelet Fraction    Collection Time: 04/30/19 11:37 AM   Result Value Ref Range    Platelet, Immature Fraction 3.2 1.1 - 10.3 %    Platelet, Fluorescence 115 (L) 138 - 453 k/uL       IMAGING  CT head showed no acute intracranial abnormality. Comminuted C1 fracture. IMPRESSION  1. Cardiac arrest on hypothermia protocol  On versed, nimbex     2. Myoclonic jerks - follow up EEG to rule out subclinical seizures or anoxic myoclonic  seizures   Follow up MRI Brain     3. Leukocytosis- improving, follow up pan cultures     4. Elevated troponin  Likely type 2 MI   Started on low intensity heparin and aspirin   Follow up 2D echo  Need ischemic work up later     5. C1 fracture   Neurosurgery on board  follow up MRI brain and spine       Thank you for the consultation. Will follow.  Case consulted with Cristina Mathis

## 2019-04-30 NOTE — FLOWSHEET NOTE
1730- re-warming initiated with target temp 36 degrees Celsius at a rate of 0.25 degrees per hour. IV fluid bolus of 500 ml initiated over 2 hours and BMP drawn and sent to lab. RN will continue to monitor patient.

## 2019-04-30 NOTE — H&P
Attestation signed by      Attending Physician Statement:    I have discussed the care of  Edvin Cummins , including pertinent history and exam findings, with the Cardiology fellow/resident. I have seen and examined the patient and the key elements of all parts of the encounter have been performed by me. I agree with the assessment, plan and orders as documented by the fellow/resident, after I modified exam findings and plan of treatments, and the final version is my approved version of the assessment. Additional Comments:   56 y/o male with no known previous cardiac hx admitted after PEA cardiac arrest at home requiring two round s of CPR with total downtime of approximately 10-15 mins. Was hypotensive on admission but now of vasopressors. In sinus rhythm with no vent arrhythmias overnight. ECG on admission showed SR, LVH, prolonged QTc and no acute ST changes. HS trops are elevated likely type II MI, however ACS is not entirely ruled out. . Currently intubated and sedated with therapeutic hypothermia protocol on board. Cont supportive care. Monitor mentation post rewarming. Start low intensity heparin drip and ASA. Obtain echo. Monitor heart rate. He will eventually need coronary ischemia work up if meaningful neurological recovery. Discussed in details with patient family and CC team.        [facility]    Consult / H&P               Today's Date: 4/30/2019  Patient Name: Edvin Cummins  Date of admission: 4/29/2019 12:02 PM  Patient's age: 81 y.o., 1935  Admission Dx: Cardiac arrest [facility]) [I46.9]    Reason for Consult:  Cardiac evaluation    Requesting Physician: Ave Villalba MD    CHIEF COMPLAINT:  S/P Cardiac arrest     History Obtained From:  patient, electronic medical record    HISTORY OF PRESENT ILLNESS:      The patient is a 81 y.o.   male with PMH of essential Hypertension, right nephrectomy 2nd to renal cell ca was brought into the emergency department post cardiac arrest.  Per records patient reportedly called out his wife and was then found unresponsive without a pulse. Patient was found to be in PEA arrest received 2 rounds of epinephrine and CPR and ROS he was achieved. Patient was intubated at the site. And was brought into the [facility] emergency department and was found to be hypotensive and patient was started on levophed. Later patient became bradycardic and patient was started on epinephrine. Patient's blood pressure went up to 361 systolic and later patient was started on dopamine infusion. Patient had to be started on Cardene infusion because of hypertension, hypothermia protocol was started. Patient was having myoclonic jerks and patient was put on Nimbex. On my evaluation patient was on Cardene infusion, was bradycardic, intubated sedated and paralyzed. Issued on presentation patient's troponin was elevated and trended down from 109, 50, 29    Past Medical History:   has a past medical history of Acute bronchitis, Anemia, Bacteremia, Benign essential HTN, CKD (chronic kidney disease) stage 3, GFR 30-59 ml/min ([facility]), GERD (gastroesophageal reflux disease), Gout, Renal artery stenosis ([address] Renal cell carcinoma ([address] UTI (lower urinary tract infection), and Volume depletion. Past Surgical History:   has a past surgical history that includes total nephrectomy. Home Medications:    Prior to Admission medications    Medication Sig Start Date End Date Taking?  Authorizing Provider   atorvastatin (LIPITOR) 40 MG tablet Take 40 mg by mouth daily   Yes Historical Provider, MD   Cholecalciferol (VITAMIN D3) 2000 units CAPS Take 1 capsule by mouth 2 times daily   Yes Historical Provider, MD   allopurinol (ZYLOPRIM) 100 MG tablet   Take 100 mg by mouth daily  7/16/15  Yes Historical Provider, MD   oxybutynin (DITROPAN) 5 MG tablet   Take 5 mg by mouth 2 times daily Indications: Patient is only taking this once a day  11/1/14  Yes Historical Provider, MD   carvedilol (COREG) 6.25 MG tablet   Take 12.5 mg by mouth 2 times daily (with meals) Indications: Patient is taking 6.25mg po twice daily    Yes Historical Provider, MD   famotidine (PEPCID) 20 MG tablet Take 20 mg by mouth 2 times daily. Yes Historical Provider, MD   ferrous sulfate 325 (65 FE) MG tablet Take 325 mg by mouth 2 times daily.    Yes Historical Provider, MD   tamsulosin (FLOMAX) 0.4 MG capsule   Take 0.4 mg by mouth 2 times daily    Yes Historical Provider, MD      Current Facility-Administered Medications: sodium chloride flush 0.9 % injection 10 mL, 10 mL, Intravenous, 2 times per day  sodium chloride flush 0.9 % injection 10 mL, 10 mL, Intravenous, PRN  magnesium hydroxide (MILK OF MAGNESIA) 400 MG/5ML suspension 30 mL, 30 mL, Oral, Daily PRN  ondansetron (ZOFRAN) injection 4 mg, 4 mg, Intravenous, Q6H PRN  enoxaparin (LOVENOX) injection 40 mg, 40 mg, Subcutaneous, Daily  0.9 % sodium chloride infusion, , Intravenous, Continuous  potassium chloride 10 mEq/100 mL IVPB (Peripheral Line), 10 mEq, Intravenous, PRN  magnesium sulfate 1 g in dextrose 5% 100 mL IVPB, 1 g, Intravenous, PRN  famotidine (PEPCID) injection 20 mg, 20 mg, Intravenous, BID  norepinephrine (LEVOPHED) 16 mg in dextrose 5 % 250 mL infusion, 2 mcg/min, Intravenous, Continuous  vancomycin (VANCOCIN) intermittent dosing (placeholder), , Other, RX Placeholder  vancomycin (VANCOCIN) 1,750 mg in dextrose 5 % 500 mL IVPB, 1,750 mg, Intravenous, Q24H  ondansetron (ZOFRAN) injection 4 mg, 4 mg, Intravenous, Q6H PRN  chlorhexidine (PERIDEX) 0.12 % solution 15 mL, 15 mL, Mouth/Throat, BID  cisatracurium besylate (NIMBEX) 200 mg in sodium chloride 0.9 % 100 mL infusion, 2 mcg/kg/min, Intravenous, Continuous  midazolam (VERSED) 100 mg in dextrose 5% 100 mL infusion, 1 mg/hr, Intravenous, Continuous  DOPamine (INTROPIN) 400 mg in dextrose 5 % 250 mL infusion, 2.5 mcg/kg/min, Intravenous, Continuous  niCARdipine (CARDENE) 20 mg in

## 2019-04-30 NOTE — CONSULTS
Palliative Care Inpatient Consult    NAME:  Shea Gunter RECORD NUMBER:  [id number]  AGE: 81 y.o. GENDER: male  : 1935  TODAY'S DATE:  2019    Reasons for Consultation:    Provision of information regarding PC and/or hospice philosophies  Complex, time-intensive communication and interdisciplinary psychosocial support  Clarification of goals of care and/or assistance with difficult decision-making  Guidance in regards to resources and transition(s)    Members of PC team contributing to this consultation are :  Jagdeep Peng RN BSN CHPN    History of Present Illness     The patient is a 81 y.o. Non-/non  male who presents with Cardiac Arrest    Referred to Palliative Care by   [] Physician   [x] Nursing  [] Family Request   [] Other:       He was [facility] to the Critical Care service [facility] [[facility].9]. His hospital course has been associated with Cardiac arrest [facility]). The patient has a complicated medical history and has been hospitalized since 2019 12:02 PM.    Active Hospital Problems    Diagnosis Date Noted    Myoclonic jerking [G25.3] 2019    Bradycardia [R00.1] 2019    Cardiac arrest ([facility] [I46.9] 2019    Benign essential HTN [I10]        Data        Code Status: Full Code     ADVANCED CARE PLANNING:  Patient has capacity for medical decisions: no  Health Care Power of : not asked  Living Will: not asked     Personal, Social, and Family History  Marital Status:   Living situation:with family:  spouse  Maribel/Spiritual History:   Not asked at this time  Psychological Distress:  Patient currently intubated/sedated/paralyzed  Does patient understand diagnosis/treatment? no  Does family/caregiver understand diagnosis/treatment?  yes    Assessment        Palliative Performance Scale:    ___100% Full ambulation; normal activity and work; no evidence of disease; able to do own self care; normal intake; fully conscious  ___90% Full ambulation; normal activity and work; some evidence of disease; able to do own self care; normal intake; fully conscious  ___80% Full ambulation; normal activity with effort; some evidence of disease; able to do own self care; normal or reduced intake; fully conscious  ___70% Ambulation reduced; unable to perform normal job/work; significant disease; able to do own self care; normal or reduced intake; fully conscious  ___60%  Ambulation reduced; cannot do hobbies/housework; significant disease; occasional assist; intake normal or reduced; fully conscious/some confusion  ___50%  Mainly sit/lie; can't do any work; extensive disease; considerable assist; intake normal or reduced; fully conscious/some confusion  ___40%  Mainly in bed; extensive disease; mainly assist; intake normal or reduced; fully conscious/ some confusion   ___30%  Bed bound; extensive disease; total care; intake reduced; fully conscious/some confusion  ___20%  Bed bound; extensive disease; total care; intake minimal; drowsy/coma  _X_10%  Bed bound; extensive disease; total care; mouth care only; drowsy/coma  ___0       Death       Risk Assessments:    Readmission Risk Score: 26      Plan        Palliative Interaction:  Palliative Care was consulted by nursing for support. Patient suffered a cardiac arrest and is currently undergoing hypothermia protocol and is unable to participate in conversation. Patient's daughter Janine Leone is at the bedside. Janine Leone states that the patient and her mother have been  for many years. She states that he is normally very independent and he and her mother \"get by with each other's help. \"  Janine Leone states that her mother was at the stove when she heard the patient call out and she \"heard a thud. \"  Janine Leone states that this is the first time anything like that has happened with the patient. Patient did have renal cell cancer and a right total nephrectomy back 12 years ago.   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 [facility] states that her mother was up earlier but her brother took her home to rest and that she will be back up tonight. Update from patient's bedside RN Anali White indicates that the patient is expected to be rewarmed tomorrow morning around 1930. Emotional support provided. Education/support to family  Providing support for coping/adaptation/distress of family    Principle Problem/Diagnosis:  Cardiac arrest ([facility] [I46.9]    Goals of care evaluation:  The patient goals of care are live longer, improve or maintain function/quality of life and support for family/caregiver   Goals of care discussed with:    [] Patient independently    [] Patient and Family    [x] Family or Healthcare DPOA independently    [] Unable to discuss with patient, family/DPOA not present    Code Status  Full Code    Other recommendations:  Please call with any palliative questions or concerns. Palliative Care Team is available via perfect serve or via phone - [phone]. Palliative Care will continue to follow Mr. Price's care as needed. Thank you for allowing Palliative Care to participate in the care of Mr. Edouard Shelton .     Electronically signed by   Zenaida Colby RN  Palliative Care Team  on 4/30/2019 at 2:30 PM    Palliative care office: [phone]

## 2019-04-30 NOTE — PLAN OF CARE
Chaparro Saunders RN  Outcome: Ongoing  4/30/2019 0500 by Damian Stevens RN  Outcome: Ongoing  4/29/2019 1947 by Chaparro Saunders RN  Outcome: Ongoing     Problem: Discharge Planning:  Goal: Ability to perform activities of daily living will improve  Description  Ability to perform activities of daily living will improve  4/30/2019 0826 by Chaparro Saunders RN  Outcome: Ongoing  4/30/2019 0500 by Damian Stevens RN  Outcome: Ongoing  4/29/2019 1947 by Chaparro Saunders RN  Outcome: Ongoing  Goal: Participates in care planning  Description  Participates in care planning  4/30/2019 0826 by Chaparro Saunders RN  Outcome: Ongoing  4/30/2019 0500 by Damian Stevens RN  Outcome: Ongoing  4/29/2019 1947 by Chaparro Saunders RN  Outcome: Ongoing     Problem: Injury - Risk of, Physical Injury:  Goal: Absence of physical injury  Description  Absence of physical injury  4/30/2019 0826 by Chaparro Saunders RN  Outcome: Ongoing  4/30/2019 0500 by Damian Stevens RN  Outcome: Ongoing  4/29/2019 1947 by Chaparro Saunders RN  Outcome: Ongoing  Goal: Will remain free from falls  Description  Will remain free from falls  4/30/2019 0826 by Chaparro Saunders RN  Outcome: Ongoing  4/30/2019 0500 by Damian Stevens RN  Outcome: Ongoing  4/29/2019 1947 by Chaparro Saunders RN  Outcome: Ongoing     Problem: Mood - Altered:  Goal: Mood stable  Description  Mood stable  4/30/2019 0826 by Chaparro Saunders RN  Outcome: Ongoing  4/30/2019 0500 by Damian Stevens RN  Outcome: Ongoing  4/29/2019 1947 by Chaparro Saunders RN  Outcome: Ongoing  Goal: Absence of abusive behavior  Description  Absence of abusive behavior  4/30/2019 0826 by Chaparro Saunders RN  Outcome: Ongoing  4/30/2019 0500 by Damian Stevens RN  Outcome: Ongoing  4/29/2019 1947 by Chaparro Saunders RN  Outcome: Ongoing  Goal: Verbalizations of feeling emotionally comfortable while being cared for will increase  Description  Verbalizations of feeling emotionally comfortable while being cared for will increase  4/30/2019 0826 by Colin Robertson RN  Outcome: Ongoing  4/30/2019 0500 by Ricky Rogers RN  Outcome: Ongoing  4/29/2019 1947 by Colin Robertson RN  Outcome: Ongoing     Problem: Psychomotor Activity - Altered:  Goal: Absence of psychomotor disturbance signs and symptoms  Description  Absence of psychomotor disturbance signs and symptoms  4/30/2019 0826 by Colin Robertson RN  Outcome: Ongoing  4/30/2019 0500 by Ricky Rogers RN  Outcome: Ongoing  4/29/2019 1947 by Colin Robertson RN  Outcome: Ongoing     Problem: Sensory Perception - Impaired:  Goal: Demonstrations of improved sensory functioning will increase  Description  Demonstrations of improved sensory functioning will increase  4/30/2019 0826 by Colin Robertson RN  Outcome: Ongoing  4/30/2019 0500 by Ricky Rogers RN  Outcome: Ongoing  4/29/2019 1947 by Colin Robertson RN  Outcome: Ongoing  Goal: Decrease in sensory misperception frequency  Description  Decrease in sensory misperception frequency  4/30/2019 0826 by Colin Robertson RN  Outcome: Ongoing  4/30/2019 0500 by Ricky Rogers RN  Outcome: Ongoing  4/29/2019 1947 by Colin Robertson RN  Outcome: Ongoing  Goal: Able to refrain from responding to false sensory perceptions  Description  Able to refrain from responding to false sensory perceptions  4/30/2019 0826 by Colin Robertson RN  Outcome: Ongoing  4/30/2019 0500 by Ricky Rogers RN  Outcome: Ongoing  4/29/2019 1947 by Colin Robertson RN  Outcome: Ongoing  Goal: Demonstrates accurate environmental perceptions  Description  Demonstrates accurate environmental perceptions  4/30/2019 0826 by Colin Robertson RN  Outcome: Ongoing  4/29/2019 1947 by Colin Robertson RN  Outcome: Ongoing  Goal: Able to distinguish between reality-based and nonreality-based thinking  Description  Able to distinguish between reality-based and nonreality-based thinking  4/30/2019 0826 by Teresa Conner RN  Outcome: Ongoing  4/30/2019 0500 by Guilherme Rawls RN  Outcome: Ongoing  4/29/2019 1947 by Teresa Conner RN  Outcome: Ongoing  Goal: Able to interrupt nonreality-based thinking  Description  Able to interrupt nonreality-based thinking  4/30/2019 0826 by Teresa Conner RN  Outcome: Ongoing  4/30/2019 0500 by Guilherme Rawls RN  Outcome: Ongoing  4/29/2019 1947 by Teresa Conner RN  Outcome: Ongoing     Problem: Sleep Pattern Disturbance:  Goal: Appears well-rested  Description  Appears well-rested  4/30/2019 0826 by Teresa Conner RN  Outcome: Ongoing  4/30/2019 0500 by Guilherme Rawls RN  Outcome: Ongoing  4/29/2019 1947 by Teresa Conner RN  Outcome: Ongoing     Problem: Falls - Risk of:  Goal: Absence of physical injury  Description  Absence of physical injury  4/30/2019 0826 by Teresa Conner RN  Outcome: Ongoing  4/30/2019 0500 by Guilherme Rawls RN  Outcome: Ongoing  4/29/2019 1947 by Teresa Conner RN  Outcome: Ongoing  Goal: Will remain free from falls  Description  Will remain free from falls  4/30/2019 0826 by Teresa Conner RN  Outcome: Ongoing  4/30/2019 0500 by Guilherme Rawls RN  Outcome: Ongoing  4/29/2019 1947 by Teresa Conner RN  Outcome: Ongoing     Problem: Risk for Impaired Skin Integrity  Goal: Tissue integrity - skin and mucous membranes  Description  Structural intactness and normal physiological function of skin and  mucous membranes.   4/30/2019 0826 by Teresa Conner RN  Outcome: Ongoing  4/30/2019 0500 by Guilherme Rawls RN  Outcome: Ongoing  4/29/2019 1947 by Teresa Conner RN  Outcome: Ongoing

## 2019-04-30 NOTE — PLAN OF CARE
Problem: MECHANICAL VENTILATION  Goal: Patient will maintain patent airway  4/30/2019 0500 by Taj Woody RN  Outcome: Ongoing  4/29/2019 1947 by Cara Reyes RN  Outcome: Ongoing  Goal: Oral health is maintained or improved  4/30/2019 0500 by Taj Woody RN  Outcome: Ongoing  4/29/2019 1947 by Cara Reyes RN  Outcome: Ongoing  Goal: ET tube will be managed safely  4/30/2019 0500 by Taj Woody RN  Outcome: Ongoing  4/29/2019 1947 by Cara Reyes RN  Outcome: Ongoing  Goal: Ability to express needs and understand communication  4/30/2019 0500 by Taj Woody RN  Outcome: Ongoing  4/29/2019 1947 by Cara Reyes RN  Outcome: Ongoing  Goal: Mobility/activity is maintained at optimum level for patient  4/30/2019 0500 by Taj Woody RN  Outcome: Ongoing  4/29/2019 1947 by Cara Reyes RN  Outcome: Ongoing     Problem: Confusion - Acute:  Goal: Absence of continued neurological deterioration signs and symptoms  Description  Absence of continued neurological deterioration signs and symptoms  4/30/2019 0500 by Taj Woody RN  Outcome: Ongoing  4/29/2019 1947 by Cara Reyes RN  Outcome: Ongoing  Goal: Mental status will be restored to baseline  Description  Mental status will be restored to baseline  4/30/2019 0500 by Taj Woody RN  Outcome: Ongoing  4/29/2019 1947 by Cara Reyes RN  Outcome: Ongoing     Problem: Discharge Planning:  Goal: Ability to perform activities of daily living will improve  Description  Ability to perform activities of daily living will improve  4/30/2019 0500 by Taj Woody RN  Outcome: Ongoing  4/29/2019 1947 by Cara Reyes RN  Outcome: Ongoing  Goal: Participates in care planning  Description  Participates in care planning  4/30/2019 0500 by Taj Woody RN  Outcome: Ongoing  4/29/2019 1947 by Cara Reyes RN  Outcome: Ongoing     Problem: Injury - Risk of, Physical Injury:  Goal: Absence of physical injury  Description  Absence of physical injury  4/30/2019 0500 by Mike Xie RN  Outcome: Ongoing  4/29/2019 1947 by Edwena Barthel, RN  Outcome: Ongoing  Goal: Will remain free from falls  Description  Will remain free from falls  4/30/2019 0500 by Mike Xie RN  Outcome: Ongoing  4/29/2019 1947 by Edwena Barthel, RN  Outcome: Ongoing     Problem: Mood - Altered:  Goal: Mood stable  Description  Mood stable  4/30/2019 0500 by Mike Xie RN  Outcome: Ongoing  4/29/2019 1947 by Edwena Barthel, RN  Outcome: Ongoing  Goal: Absence of abusive behavior  Description  Absence of abusive behavior  4/30/2019 0500 by Mike Xie RN  Outcome: Ongoing  4/29/2019 1947 by Edwena Barthel, RN  Outcome: Ongoing  Goal: Verbalizations of feeling emotionally comfortable while being cared for will increase  Description  Verbalizations of feeling emotionally comfortable while being cared for will increase  4/30/2019 0500 by Mike Xie RN  Outcome: Ongoing  4/29/2019 1947 by Edwena Barthel, RN  Outcome: Ongoing     Problem: Psychomotor Activity - Altered:  Goal: Absence of psychomotor disturbance signs and symptoms  Description  Absence of psychomotor disturbance signs and symptoms  4/30/2019 0500 by Mike Xie RN  Outcome: Ongoing  4/29/2019 1947 by Edwena Barthel, RN  Outcome: Ongoing     Problem: Sensory Perception - Impaired:  Goal: Demonstrations of improved sensory functioning will increase  Description  Demonstrations of improved sensory functioning will increase  4/30/2019 0500 by Mike Xie RN  Outcome: Ongoing  4/29/2019 1947 by Edwena Barthel, RN  Outcome: Ongoing  Goal: Decrease in sensory misperception frequency  Description  Decrease in sensory misperception frequency  4/30/2019 0500 by Mike Xie RN  Outcome: Ongoing  4/29/2019 1947 by Edwena Barthel, RN  Outcome: Ongoing  Goal: Able to refrain from responding to false sensory perceptions  Description  Able to refrain from responding to false sensory perceptions  4/30/2019 0500 by Surya Cobb RN  Outcome: Ongoing  4/29/2019 1947 by Candy Talley RN  Outcome: Ongoing  Goal: Demonstrates accurate environmental perceptions  Description  Demonstrates accurate environmental perceptions  4/29/2019 1947 by Candy Talley RN  Outcome: Ongoing  Goal: Able to distinguish between reality-based and nonreality-based thinking  Description  Able to distinguish between reality-based and nonreality-based thinking  4/30/2019 0500 by Surya Cobb RN  Outcome: Ongoing  4/29/2019 1947 by Candy Talley RN  Outcome: Ongoing  Goal: Able to interrupt nonreality-based thinking  Description  Able to interrupt nonreality-based thinking  4/30/2019 0500 by Surya Cobb RN  Outcome: Ongoing  4/29/2019 1947 by Candy Tlaley RN  Outcome: Ongoing     Problem: Sleep Pattern Disturbance:  Goal: Appears well-rested  Description  Appears well-rested  4/30/2019 0500 by Surya Cobb RN  Outcome: Ongoing  4/29/2019 1947 by Candy Talley RN  Outcome: Ongoing     Problem: Falls - Risk of:  Goal: Absence of physical injury  Description  Absence of physical injury  4/30/2019 0500 by Surya Cobb RN  Outcome: Ongoing  4/29/2019 1947 by Candy Talley RN  Outcome: Ongoing  Goal: Will remain free from falls  Description  Will remain free from falls  4/30/2019 0500 by Surya Cobb RN  Outcome: Ongoing  4/29/2019 1947 by Candy Talley RN  Outcome: Ongoing     Problem: Risk for Impaired Skin Integrity  Goal: Tissue integrity - skin and mucous membranes  Description  Structural intactness and normal physiological function of skin and  mucous membranes.   4/30/2019 0500 by Surya Cobb RN  Outcome: Ongoing  4/29/2019 1947 by Candy Talley RN  Outcome: Ongoing

## 2019-04-30 NOTE — PROGRESS NOTES
Reviewed TERT exam and pelvis XR. Recommend non urgent CT pelvis. Trauma surgery to sign off at this time. Please call back when CT pelvis performed and patient extubated for evaluation.     Electronically signed by Juliette Marie DO on 4/30/2019 at 6:28 AM

## 2019-04-30 NOTE — PLAN OF CARE
Problem: MECHANICAL VENTILATION  Goal: Patient will maintain patent airway  4/30/2019 0743 by Evan Hardy RCP  Outcome: Ongoing  4/30/2019 0500 by Stephen Bunn RN  Outcome: Ongoing  4/29/2019 1947 by Karo Spivey RN  Outcome: Ongoing  Goal: Oral health is maintained or improved  4/30/2019 0743 by Evan Hardy RCP  Outcome: Ongoing  4/30/2019 0500 by Stephen Bunn RN  Outcome: Ongoing  4/29/2019 1947 by Karo Spivey RN  Outcome: Ongoing  Goal: ET tube will be managed safely  4/30/2019 0743 by Evan Hardy RCP  Outcome: Ongoing  4/30/2019 0500 by Stephen Bunn RN  Outcome: Ongoing  4/29/2019 1947 by Karo Spivey RN  Outcome: Ongoing  Goal: Ability to express needs and understand communication  4/30/2019 0743 by Evan Hardy RCP  Outcome: Ongoing  4/30/2019 0500 by Stephen Bunn RN  Outcome: Ongoing  4/29/2019 1947 by Karo Spivey RN  Outcome: Ongoing  Goal: Mobility/activity is maintained at optimum level for patient  4/30/2019 0743 by Evan Hardy RCP  Outcome: Ongoing  4/30/2019 0500 by Stephen Bunn RN  Outcome: Ongoing  4/29/2019 1947 by Karo Spivey RN  Outcome: Ongoing     Problem: Confusion - Acute:  Goal: Absence of continued neurological deterioration signs and symptoms  Description  Absence of continued neurological deterioration signs and symptoms  4/30/2019 0500 by Stephen Bunn RN  Outcome: Ongoing  4/29/2019 1947 by Karo Spivey RN  Outcome: Ongoing  Goal: Mental status will be restored to baseline  Description  Mental status will be restored to baseline  4/30/2019 0500 by Stephen Bunn RN  Outcome: Ongoing  4/29/2019 1947 by Karo Spivey RN  Outcome: Ongoing     Problem: Discharge Planning:  Goal: Ability to perform activities of daily living will improve  Description  Ability to perform activities of daily living will improve  4/30/2019 0500 by Stephen Bunn RN  Outcome: Ongoing  4/29/2019 1947 by Dionna John RN  Outcome: Ongoing  Goal: Participates in care planning  Description  Participates in care planning  4/30/2019 0500 by Ed Velasquez RN  Outcome: Ongoing  4/29/2019 1947 by Dionan John RN  Outcome: Ongoing     Problem: Injury - Risk of, Physical Injury:  Goal: Absence of physical injury  Description  Absence of physical injury  4/30/2019 0500 by Ed Velasquez RN  Outcome: Ongoing  4/29/2019 1947 by Dionna John RN  Outcome: Ongoing  Goal: Will remain free from falls  Description  Will remain free from falls  4/30/2019 0500 by Ed Velasquez RN  Outcome: Ongoing  4/29/2019 1947 by Dionna John RN  Outcome: Ongoing     Problem: Mood - Altered:  Goal: Mood stable  Description  Mood stable  4/30/2019 0500 by Ed Velasquez RN  Outcome: Ongoing  4/29/2019 1947 by Dionna John RN  Outcome: Ongoing  Goal: Absence of abusive behavior  Description  Absence of abusive behavior  4/30/2019 0500 by Ed Velasquez RN  Outcome: Ongoing  4/29/2019 1947 by Dionna John RN  Outcome: Ongoing  Goal: Verbalizations of feeling emotionally comfortable while being cared for will increase  Description  Verbalizations of feeling emotionally comfortable while being cared for will increase  4/30/2019 0500 by Ed Velasquez RN  Outcome: Ongoing  4/29/2019 1947 by Dionna John RN  Outcome: Ongoing     Problem: Psychomotor Activity - Altered:  Goal: Absence of psychomotor disturbance signs and symptoms  Description  Absence of psychomotor disturbance signs and symptoms  4/30/2019 0500 by Ed Velasquez RN  Outcome: Ongoing  4/29/2019 1947 by Dionna John RN  Outcome: Ongoing     Problem: Sensory Perception - Impaired:  Goal: Demonstrations of improved sensory functioning will increase  Description  Demonstrations of improved sensory functioning will increase  4/30/2019 0500 by Ed Velasquez RN  Outcome: Ongoing  4/29/2019 [id number] by Dionna John RN  Outcome: Ongoing  Goal: Decrease in sensory misperception frequency  Description  Decrease in sensory misperception frequency  4/30/2019 0500 by Wilmar Read RN  Outcome: Ongoing  4/29/2019 1947 by Martha Daley RN  Outcome: Ongoing  Goal: Able to refrain from responding to false sensory perceptions  Description  Able to refrain from responding to false sensory perceptions  4/30/2019 0500 by Wilmar Read RN  Outcome: Ongoing  4/29/2019 1947 by Martha Daley RN  Outcome: Ongoing  Goal: Demonstrates accurate environmental perceptions  Description  Demonstrates accurate environmental perceptions  4/29/2019 1947 by Martha Daley RN  Outcome: Ongoing  Goal: Able to distinguish between reality-based and nonreality-based thinking  Description  Able to distinguish between reality-based and nonreality-based thinking  4/30/2019 0500 by Wilmar Read RN  Outcome: Ongoing  4/29/2019 1947 by Martha Daley RN  Outcome: Ongoing  Goal: Able to interrupt nonreality-based thinking  Description  Able to interrupt nonreality-based thinking  4/30/2019 0500 by Wilmar Read RN  Outcome: Ongoing  4/29/2019 1947 by Martha Daley RN  Outcome: Ongoing     Problem: Sleep Pattern Disturbance:  Goal: Appears well-rested  Description  Appears well-rested  4/30/2019 0500 by Wilmar Read RN  Outcome: Ongoing  4/29/2019 1947 by Martha Daley RN  Outcome: Ongoing     Problem: Falls - Risk of:  Goal: Absence of physical injury  Description  Absence of physical injury  4/30/2019 0500 by Wilmar Read RN  Outcome: Ongoing  4/29/2019 1947 by Martha Daley RN  Outcome: Ongoing  Goal: Will remain free from falls  Description  Will remain free from falls  4/30/2019 0500 by Wilmar Read RN  Outcome: Ongoing  4/29/2019 1947 by Martha Daley RN  Outcome: Ongoing     Problem: Risk for Impaired Skin Integrity  Goal: Tissue integrity - skin and mucous membranes  Description  Structural intactness and normal physiological function of skin and  mucous membranes.   4/30/2019 0500 by Kika Epperson RN  Outcome: Ongoing  4/29/2019 1947 by Dedrick Hardin RN  Outcome: Ongoing

## 2019-04-30 NOTE — PROCEDURES
[facility]        CONTINUOUS VIDEO EEG MONITORING           PATIENT: Zeynep Barajas  MRN #: [id number]  DATE: 4/30/2019 at 5:24PM to 5/1/2019 at 9:50AM  REFERRING PHYSICIAN:  Josh Liao MD     BRIEF HISTORY: Patient is a 81year old male who was admitted after cardiac arrest. LTME to evaluate. AEDs:  Keppra 1000mg once, then 500mg BID; versed drip     EEG DESCRIPTION: 21 EEG electrodes were placed according to International 10/20 System. Single EKG electrode was also placed. Video recording was time-locked with EEG recording. BASELINE: Normal baseline was not obtainable. First 1 hour recording showed generalized periodic pattern, there were generalized spikes/polyspikes at low amplitude (<30uV), once to twice in one second. Bifrontally predominant. (Generalized periodic epileptiform discharges GPEDs). There was no posterior dominant rhythm, no eye opening/closing, no spontaneous movement. Hyperventilation was not testable. Photic stimulation did not activate changes in EEG. Single lead EKG showed very irregular, heart rate at 60s per minute. Day 1 - 4/30/2019 at 5:24PM to 5/1/2019 at 9:50AM  AEDs:  Keppra 1000mg once, then 500mg BID; versed drip     Interictal: Generalized periodic epileptiform discharges (spikes/polyspikes) was seen throughout this recording. Ictal: body shivering was seen, not associated with EEG seizures. Summary: During above recoding period, there was GPEDS pattern throughout this recording, which supports the diagnosis of severe anoxic brain injury secondary to cardiac arrest, findings indicate poor prognosis.        Chino Morataya MD, [address] [place] [id number], Neurology  Board Certified Epileptologist

## 2019-04-30 NOTE — PROGRESS NOTES
Patients BIS remaining at 10 with  TOF 0/4 at [facility]. RN increased Versed gtt to 2mg/hr due to patients increased HR and increased blood pressure. Nimbex remains at 1mcg/kg/min. Will continue to monitor.

## 2019-05-01 PROBLEM — S12.000A C1 CERVICAL FRACTURE (HCC): Status: ACTIVE | Noted: 2019-01-01

## 2019-05-01 NOTE — CARE COORDINATION
Alcohol Screening and Brief Intervention      Pt deferred due to his condition.                Deferred [x]    Completed on: 5/1/2019   MILIND Morse

## 2019-05-01 NOTE — FLOWSHEET NOTE
05/01/19 1527   Provider Notification   Reason for Communication Evaluate  (still plans for holiday sedation?)   Provider Name Elise Akers   Provider Notification Nurse Practitioner   Method of Communication Secure Message   Response Other (Comment)  (see progress note attached)   Notification Time 1527   RN paged Elise Akers- Dr. Melanie Nunez rounding to update family and did not want RN to complete a sedation holiday at 1600. RN asked if Elise Akers wanted to come down for a sedation holiday- she said to hold off. RN not to complete a sedation holiday.

## 2019-05-01 NOTE — PROGRESS NOTES
Continuous vEEG Monitoring Brief Update Note    Recording from last night until now reviewed, remains in generalized periodic epileptiform discharge pattern (GPEDs), indicates increased risk for seizures, consistent with the diagnosis of severe anoxic encephalopathy, no EEG or clinical seizures were seen.      Marlen Kim MD, [address] [place] [address], Neurology  Board Certified Epileptologist

## 2019-05-01 NOTE — PROGRESS NOTES
Patient remains paralyzed on cooling protocol. No new changes per nursing. Patient scheduled for brain and cervical MRIs when stable. No new suggestions from neurosurgery perspective currently.   Above discussed with family at bedside this AM

## 2019-05-01 NOTE — PLAN OF CARE
Problem: MECHANICAL VENTILATION  Goal: Patient will maintain patent airway  5/1/2019 1942 by Norleen Jeans, RCP  Outcome: Ongoing  5/1/2019 0923 by Nimco Gonzalez RN  Outcome: Ongoing  5/1/2019 0742 by Alise Amaya, FELICIAP  Outcome: Ongoing  5/1/2019 0647 by Calvin Camilo RN  Outcome: Ongoing  Goal: Oral health is maintained or improved  5/1/2019 1942 by Norleen Jeans, RCP  Outcome: Ongoing  5/1/2019 0923 by Nimco Gonzalez RN  Outcome: Ongoing  5/1/2019 0742 by Alise Amaya, RCP  Outcome: Ongoing  5/1/2019 0647 by Calvin Camilo RN  Outcome: Ongoing  Goal: ET tube will be managed safely  5/1/2019 1942 by Norleen Jeans, RCP  Outcome: Ongoing  5/1/2019 0923 by Nimco Gonzalez RN  Outcome: Ongoing  5/1/2019 0742 by Alise Amaya, RCP  Outcome: Ongoing  5/1/2019 0647 by Calvin Camilo RN  Outcome: Ongoing  Goal: Ability to express needs and understand communication  5/1/2019 1942 by Norleen Jeans, DENICE  Outcome: Ongoing  5/1/2019 0923 by Nimco Gonzalez RN  Outcome: Ongoing  5/1/2019 0742 by Alise Amaya RCP  Outcome: Ongoing  5/1/2019 0647 by Calvin Camilo RN  Outcome: Ongoing  Goal: Mobility/activity is maintained at optimum level for patient  5/1/2019 1942 by Norleen Jeans, DENICE  Outcome: Ongoing  5/1/2019 0923 by Nimco Gonzalez RN  Outcome: Ongoing  5/1/2019 0742 by Alise Amaya RCP  Outcome: Ongoing  5/1/2019 0647 by Calvin Camilo RN  Outcome: Ongoing

## 2019-05-01 NOTE — PROGRESS NOTES
Critical Care Team - Daily Progress Note      Date and time: 5/1/2019 8:04 AM  Patient's name:  Ernie Hilliard Record Number: [id number]  Patient's account/billing number: [de-identified]  Patient's YOB: 1935  Age: 81 y.o.   Date of Admission: 4/29/2019 12:02 PM  Length of stay during current admission: 2      Primary Care Physician: Daniel Patel DO  ICU Attending Physician: Dr. Carter Blanisreen Status: Full Code    Reason for ICU admission: cardiac arrest      SUBJECTIVE:     OVERNIGHT EVENTS:       Still on hypothermia protocol, off cardene drip, no acute events overnight, vitals stable, connected to video EEG by neuro, case discussed with RN    AWAKE & FOLLOWING COMMANDS:  [x] No   [] Yes    CURRENT VENTILATION STATUS:     [x] Ventilator  [x] BIPAP  [] Nasal Cannula [] Room Air      IF INTUBATED, ET TUBE MARKING AT LOWER LIP:       cms    SECRETIONS Amount:  [] Small [] Moderate  [] Large  [x] None  Color:     [] White [] Colored  [] Bloody    SEDATION:  RAAS Score:  [] Propofol gtt  [x] Versed gtt  [] Ativan gtt   [] No Sedation    PARALYZED:  [] No    [x] Yes    DIARRHEA:                [x] No                [] Yes  (C. Difficile status: [] positive                                                                                                                       [] negative                                                                                                                     [] pending)    VASOPRESSORS:  [x] No    [] Yes    If yes -   [] Levophed       [] Dopamine     [] Vasopressin       [] Dobutamine  [] Phenylephrine         [] Epinephrine    CENTRAL LINES:     [] No   [x] Yes   (Date of Insertion:   )           If yes -     [] Right IJ     [] Left IJ [] Right Femoral [] Left Femoral                   [] Right Subclavian [] Left Subclavian       TORIBIO'S CATHETER:   [] No   [x] Yes  (Date of Insertion:   )     URINE OUTPUT:            [] Good   [x] Low              [] Anuric      OBJECTIVE:     VITAL SIGNS:  BP (!) 106/50   Pulse 77   Temp 95.7 °F (35.4 °C) (Core)   Resp (!) 3   Ht 6' 0.05\" (1.83 m)   Wt (!) 320 lb 5.3 oz (145.3 kg)   SpO2 98%   BMI 43.39 kg/m²   Tmax over 24 hours:  Temp (24hrs), Av.6 °F (33.7 °C), Min:91 °F (32.8 °C), Max:95.7 °F (35.4 °C)      Patient Vitals for the past 6 hrs:   BP Temp Temp src Pulse Resp SpO2   19 0724 -- -- -- -- (!) 3 98 %   19 0717 -- -- -- 77 9 96 %   19 0700 (!) 106/50 -- -- 75 14 96 %   19 0600 (!) 106/51 -- -- 79 (!) 6 --   19 0500 (!) 101/47 -- -- 77 -- --   19 0400 (!) 125/55 95.7 °F (35.4 °C) CORE 77 -- --   19 0316 -- -- -- 77 -- 100 %   19 0300 (!) 124/57 -- -- 78 -- --         Intake/Output Summary (Last 24 hours) at 2019 0804  Last data filed at 2019 0600  Gross per 24 hour   Intake 3812.04 ml   Output 585 ml   Net 3227.04 ml     Wt Readings from Last 2 Encounters:   19 (!) 320 lb 5.3 oz (145.3 kg)   08/04/15 (!) 310 lb (140.6 kg)     Body mass index is 43.39 kg/m².         PHYSICAL EXAMINATION:    General appearance - intubated and sedated  Mental status - intubated and sedated  Eyes -anicteric  Ears - right ear normal, left ear normal  Nose - normal and patent, no erythema, discharge or polyps  Mouth - ET tube is in place  Neck - supple, no significant adenopathy, intubated  Chest - clear to auscultation, no wheezes, rales or rhonchi, symmetric air entry  Heart - normal rate and regular rhythm, S1 and S2 normal  Abdomen - soft, nontender, nondistended, no masses or organomegaly  Neurological - intubated/sedated and paralyzed  Extremities - peripheral pulses normal, no pedal edema, no clubbing or cyanosis, no pedal edema noted  Skin - normal coloration and turgor, no rashes, no suspicious skin lesions noted      Any additional physical findings:    MEDICATIONS:    Scheduled Meds:   amLODIPine  5 mg Oral Daily    aspirin  81 mg Oral Daily    levetiracetam  500 mg Intravenous Q12H    sodium chloride flush  10 mL Intravenous 2 times per day    famotidine (PEPCID) injection  20 mg Intravenous BID    chlorhexidine  15 mL Mouth/Throat BID     Continuous Infusions:   heparin (porcine) 4.88 Units/kg/hr (05/01/19 0139)    sodium chloride 100 mL/hr at 05/01/19 0218    cisatracurium (NIMBEX) infusion 1 mcg/kg/min (05/01/19 0427)    midazolam 4 mg/hr (04/30/19 2107)    niCARdipine Stopped (04/30/19 1151)     PRN Meds:     morphine 2 mg Q2H PRN   Or     morphine 4 mg Q2H PRN   heparin (porcine) 4,000 Units PRN   heparin (porcine) 2,000 Units PRN   sodium chloride flush 10 mL PRN   magnesium hydroxide 30 mL Daily PRN   ondansetron 4 mg Q6H PRN   potassium chloride 10 mEq PRN   magnesium sulfate 1 g PRN   ondansetron 4 mg Q6H PRN         VENT SETTINGS (Comprehensive) (if applicable):  Vent Information  $Ventilation: $Subsequent Day  Ventilator Started: Yes  Equipment Changed: HME  Vent Type: Servo i  Vent Mode: PRVC  Vt Ordered: 550 mL  Rate Set: 16 bmp  FiO2 : 40 %  Sensitivity: 5  PEEP/CPAP: 5  I Time/ I Time %: 0.9 s  Cuff Pressure (cm H2O): 22 cm H2O  Humidification Source: HME  Additional Respiratory  Assessments  Pulse: 77  Resp: (!) 3  SpO2: 98 %  End Tidal CO2: 35 (%)  Position: Semi-Tao's  Humidification Source: HME  Oral Care Completed?: Yes  Oral Care: Lip moisturizer applied, Mouth swabbed, Mouth suctioned  Subglottic Suction Done?: Yes  Cuff Pressure (cm H2O): 22 cm H2O    ABGs:     Laboratory findings:    Complete Blood Count: Recent Labs     04/30/19  0518 04/30/19  1137 04/30/19  1726 05/01/19  0002 05/01/19  0602   WBC 9.3 9.5  --   --  10.9   HGB 12.5* 12.2* 12.3* 12.1* 11.6*   HCT 38.6* 37.0* 37.4* 36.4* 37.0*   PLT See Reflexed IPF Result See Reflexed IPF Result  --   --  See Reflexed IPF Result        Last 3 Blood Glucose:   Recent Labs     04/30/19  1726 05/01/19  0002 05/01/19  0602   GLUCOSE 131* 126* 130*        PT/INR:    Lab Results   Component Value Date    PROTIME 10.7 04/29/2019    INR 1.0 04/29/2019     PTT:    Lab Results   Component Value Date    APTT 62.1 05/01/2019       Comprehensive Metabolic Profile:   Recent Labs     04/30/19  1726  05/01/19  0002 05/01/19  0147 05/01/19  0421 05/01/19  0602     --  138  --   --  140   K 4.4   < > 4.7 4.7 5.6* 4.5   *  --  110*  --   --  113*   CO2 18*  --  19*  --   --  20   BUN 24*  --  23  --   --  23   CREATININE 0.95  --  1.01  --   --  1.06   GLUCOSE 131*  --  126*  --   --  130*   CALCIUM 8.1*  --  7.9*  --   --  7.7*   PROT  --   --   --   --   --  5.0*   LABALBU  --   --   --   --   --  2.7*   BILITOT  --   --   --   --   --  0.42   ALKPHOS  --   --   --   --   --  94   AST  --   --   --   --   --  69*   ALT  --   --   --   --   --  231*    < > = values in this interval not displayed. Magnesium:   Lab Results   Component Value Date    MG 1.8 04/30/2019     Phosphorus:   Lab Results   Component Value Date    PHOS 2.9 04/30/2019     Ionized Calcium:   Lab Results   Component Value Date    CAION 1.16 04/30/2019        Urinalysis:     Troponin: No results for input(s): TROPONINI in the last 72 hours.     Microbiology:    Cultures during this admission:     Blood cultures:                 [] None drawn      [] Negative             []  Positive (Details:  )  Urine Culture:                   [] None drawn      [] Negative             []  Positive (Details:  )  Sputum Culture:               [] None drawn       [] Negative             []  Positive (Details:  )   Endotracheal aspirate:     [] None drawn       [] Negative             []  Positive (Details:  )     Other pertinent Labs:       Radiology/Imaging:     Chest Xray (5/1/2019):    ASSESSMENT:     Patient Active Problem List    Diagnosis Date Noted    Myoclonic jerking 04/30/2019    Bradycardia 04/30/2019    Cardiac arrest ([address] 04/29/2019    Gout     Anemia     GERD (gastroesophageal reflux disease)     Renal cell carcinoma (HCC)     Benign essential HTN     CKD (chronic kidney disease) stage 3, GFR 30-59 ml/min (HCC)     Volume depletion     Bacteremia     Acute bronchitis     Renal artery stenosis (HCC)        Additional assessment:    ·         PLAN:     WEAN PER PROTOCOL:  [] No   [] Yes  [] N/A    DISCONTINUE ANY LABS:   [] No   [] Yes    ICU PROPHYLAXIS:  Stress ulcer:  [] PPI Agent  [] [id number] [] Sucralfate  [] Other:  VTE:   [] Enoxaparin  [] Unfract. Heparin Subcut  [] EPC Cuffs    NUTRITION:  [] NPO [] Tube Feeding (Specify: ) [] TPN  [] PO (Diet: Diet NPO Effective Now)    HOME MEDICATIONS RECONCILED: [] No  [] Yes    INSULIN DRIP:   [] No   [] Yes    CONSULTATION NEEDED:  [] No   [] Yes    FAMILY UPDATED:    [] No   [] Yes    TRANSFER OUT OF ICU:   [] No   [] Yes    Fluids: NS at 100 mL/hr  Feeding: NPO  Analgesics: morphine  Sedation: versed  DVT Prophylaxis: on heparin    Mobilization: n/a    Heads Up: yes  GI Ulcer Prophylaxis: pepcid  Glycemic Control: n/a  SBT: not stable    Bowel management: Milk of Mag  Indwelling catheter: Murray, ET Tube, Central Line      ADDITIONAL PLAN:    1. Cardiac arrest with PEA  -continue hypothermia protocol  -on low dose heparin drip   -off cardene drip  -cardiology on board; will need ischemic w/u if meaningful neurological recovery  2. Hx of myoclonic jerks in ED  -on video EEG, will need to convert to MRI compatible EEG        -on Keppra 500 mg IV BID        -Neurology on board  3. Anemia; mild, ranges between 11.3 today and 13.1 at admission  4. C1 Fracture: Neurosurgery recs appreciated, will need MRI brain and Spine after hypothermia protocol  5. SHALA, resolved, Cr: 1.06 and GFR >60 today  6. CT pelvis needed as f/u to initial pelvic xray in the ED        Gayle Tsang M.D.              Critical care resident,  Department of Internal Medicine/ Critical care  [facility])             5/1/2019, 8:04 AM

## 2019-05-01 NOTE — PROGRESS NOTES
Unable to do MRI exams due to patient condition and body habitus. Patient fit inside scanner, however, unable to obtain brain images due to patient in C-collar and inability to lay back far enough to allow head to rest low enough for MRI techs to place top of coil on. Attempted Cervical images using flex coil, however, images were grainy and undiagnostic. Patient taken out of scanner RN aware. Patient back to unit via RN and Respiratory.

## 2019-05-01 NOTE — PROGRESS NOTES
[facility]   Progress Note                   Date:   5/1/2019  Patient name: Thomas Isaac  Date of admission:  4/29/2019 12:02 PM  MRN:   [id number]  YOB: 1935  PCP: Elvi Rai DO    Reason for Admission:      Subjective:       S/P Hypothermia protocol  Re warming  Off paralytic  Still on versed  SR with hypotension received IVF bolus overnight    Medications:   Scheduled Meds:   lactated ringers bolus  1,000 mL Intravenous Once    sodium chloride  1,000 mL Intravenous Once    vecuronium  10 mg Intravenous Once    aspirin  81 mg Oral Daily    levetiracetam  500 mg Intravenous Q12H    sodium chloride flush  10 mL Intravenous 2 times per day    famotidine (PEPCID) injection  20 mg Intravenous BID    chlorhexidine  15 mL Mouth/Throat BID       Continuous Infusions:   norepinephrine      heparin (porcine) 4.88 Units/kg/hr (05/01/19 0139)    sodium chloride 100 mL/hr at 05/01/19 0218    cisatracurium (NIMBEX) infusion Stopped (05/01/19 0805)    midazolam 6 mg/hr (05/01/19 0932)       CBC:   Recent Labs     04/30/19  0518 04/30/19  1137  05/01/19  0002 05/01/19  0602 05/01/19  0757   WBC 9.3 9.5  --   --  10.9  --    HGB 12.5* 12.2*   < > 12.1* 11.6* 11.3*   PLT See Reflexed IPF Result See Reflexed IPF Result  --   --  See Reflexed IPF Result  --     < > = values in this interval not displayed. BMP:    Recent Labs     04/30/19  1726  05/01/19  0002  05/01/19  0421 05/01/19  0602 05/01/19  0901     --  138  --   --  140  --    K 4.4   < > 4.7   < > 5.6* 4.5 4.4   *  --  110*  --   --  113*  --    CO2 18*  --  19*  --   --  20  --    BUN 24*  --  23  --   --  23  --    CREATININE 0.95  --  1.01  --   --  1.06  --    GLUCOSE 131*  --  126*  --   --  130*  --     < > = values in this interval not displayed.      Hepatic:   Recent Labs     04/29/19  1228 04/30/19  0518 05/01/19  0602   * 188* 69*   * 359* 231*   BILITOT 0.36 0.79 0.42   ALKPHOS 133* 108 94     Troponin: No results for input(s): TROPONINI in the last 72 hours. BNP: No results for input(s): BNP in the last 72 hours. Lipids: No results for input(s): CHOL, HDL in the last 72 hours. Invalid input(s): LDLCALCU  INR:   Recent Labs     04/29/19  1228 04/29/19  1757   INR 1.1 1.0       Objective:   Vitals: BP (!) 108/53   Pulse 73   Temp 96.8 °F (36 °C) (Core)   Resp 25   Ht 6' 0.05\" (1.83 m)   Wt (!) 320 lb 5.3 oz (145.3 kg)   SpO2 99%   BMI 43.39 kg/m²     General appearance: sedated and intubated   HEENT: Head: Normocephalic, no lesions, without obvious abnormality  Neck: no JVD  Lungs: clear to auscultation bilaterally, no basilar rales, no wheezing   Heart: regular rate and rhythm, S1, S2 normal, no murmur, click, rub or gallop  Abdomen: soft, non-tender; bowel sounds normal  Extremities: No LE edema  Neurologic: sedated and intubated    EKG: Sinus with T wave inversion      Echocardiogram:  Technically difficult study. Normal LV size , moderate LVH, mildly reduced LV systolic function EF 39%  Mild Atrial dilation  Aortic sclerosis no stenosis, mild AI  Small pericardial effusion      Assessment:   1. PEA arrest S/P hypothermia protocol. 2. NSTEMI  3. New onset of mild systolic CHF  4. CKD  5. Sinus bradycardia  6. Anemia  7. Thrombocytopenia  8. Encephalopathy with seizures on LTME    Treatment Plan:   1. Will recommend continuing with heparin gtt . Asa 81 mg and will do cath if there is meaningful neurological recovery  2. Hold the Anti HTN agents. Will recommend gentle hydration@ 50 ml and may need diuresis with lasix 20 IV tomorrow. X ray chest showed congestion with pul edema and b/l pleural effusion. 3. Will follow peripherally      Discussed with patient and nursing.        Vasiliy Warren MD  Fellow [facility]    Attending Cardiologist Addendum: I have reviewed and performed the history, physical, subjective, objective, assessment, and plan with the resident/fellow and agree with the note. I performed the history and physical personally. I have made changes to the note above as needed. Thank you for allowing me to participate in the care of this patient, please do not hesitate to call if you have any questions. Salina Villalba, [id number] [facility]  [facility]  [phone]

## 2019-05-01 NOTE — PLAN OF CARE
Problem: MECHANICAL VENTILATION  Goal: Patient will maintain patent airway  5/1/2019 0742 by Josseline Perez RCP  Outcome: Ongoing  5/1/2019 0647 by Damian Stevens RN  Outcome: Ongoing  Goal: Oral health is maintained or improved  5/1/2019 0742 by Josseline Perez RCP  Outcome: Ongoing  5/1/2019 0647 by Damian Stevens RN  Outcome: Ongoing  Goal: ET tube will be managed safely  5/1/2019 0742 by FELICIA CatalanP  Outcome: Ongoing  5/1/2019 0647 by Damian Stevens RN  Outcome: Ongoing  Goal: Ability to express needs and understand communication  5/1/2019 0742 by Josseline Perez RCP  Outcome: Ongoing  5/1/2019 0647 by Damian Stevens RN  Outcome: Ongoing  Goal: Mobility/activity is maintained at optimum level for patient  5/1/2019 0742 by Josseline Perez RCP  Outcome: Ongoing  5/1/2019 0647 by Damian Stevens RN  Outcome: Ongoing     Problem: Confusion - Acute:  Goal: Absence of continued neurological deterioration signs and symptoms  Description  Absence of continued neurological deterioration signs and symptoms  5/1/2019 0647 by Damian Stevens RN  Outcome: Ongoing  Goal: Mental status will be restored to baseline  Description  Mental status will be restored to baseline  5/1/2019 0647 by Damian Stevens RN  Outcome: Ongoing     Problem: Discharge Planning:  Goal: Ability to perform activities of daily living will improve  Description  Ability to perform activities of daily living will improve  5/1/2019 0647 by Damian Stevens RN  Outcome: Ongoing  Goal: Participates in care planning  Description  Participates in care planning  5/1/2019 0647 by Damian Stevens RN  Outcome: Ongoing     Problem: Injury - Risk of, Physical Injury:  Goal: Absence of physical injury  Description  Absence of physical injury  5/1/2019 0647 by Damian Stevens RN  Outcome: Ongoing  Goal: Will remain free from falls  Description  Will remain free from falls  5/1/2019 0647 by Damian Stevens RN  Outcome: Ongoing     Problem: Mood - Altered:  Goal: Mood stable  Description  Mood stable  5/1/2019 0647 by Wilmar Read RN  Outcome: Ongoing  Goal: Absence of abusive behavior  Description  Absence of abusive behavior  5/1/2019 0647 by Wilmar Read RN  Outcome: Ongoing  Goal: Verbalizations of feeling emotionally comfortable while being cared for will increase  Description  Verbalizations of feeling emotionally comfortable while being cared for will increase  5/1/2019 0647 by Wilmar Read RN  Outcome: Ongoing     Problem: Psychomotor Activity - Altered:  Goal: Absence of psychomotor disturbance signs and symptoms  Description  Absence of psychomotor disturbance signs and symptoms  5/1/2019 0647 by Wilmar Read RN  Outcome: Ongoing     Problem: Sensory Perception - Impaired:  Goal: Demonstrations of improved sensory functioning will increase  Description  Demonstrations of improved sensory functioning will increase  5/1/2019 0647 by Wilmar Read RN  Outcome: Ongoing  Goal: Decrease in sensory misperception frequency  Description  Decrease in sensory misperception frequency  5/1/2019 0647 by Wilmar Read RN  Outcome: Ongoing  Goal: Able to refrain from responding to false sensory perceptions  Description  Able to refrain from responding to false sensory perceptions  5/1/2019 0647 by Wilmar Read RN  Outcome: Ongoing  Goal: Demonstrates accurate environmental perceptions  Description  Demonstrates accurate environmental perceptions  5/1/2019 0647 by Wilmar Raed RN  Outcome: Ongoing  Goal: Able to distinguish between reality-based and nonreality-based thinking  Description  Able to distinguish between reality-based and nonreality-based thinking  5/1/2019 0647 by Wilmar Read RN  Outcome: Ongoing  Goal: Able to interrupt nonreality-based thinking  Description  Able to interrupt nonreality-based thinking  5/1/2019 0647 by Wilmar Read

## 2019-05-01 NOTE — FLOWSHEET NOTE
05/01/19 1330   Provider Notification   Reason for Communication Evaluate  (neurology resident at bedside)   Provider Name Dr. Renetta Garvey   Provider Notification Resident   Method of Communication Face to face   Response At bedside   Notification Time 1330   Dr. Renetta Garvey with neurology at bedside to update family- RN asked about holiday sedation at 1600- Dr. Renetta Garvey does not want holiday sedation since pt is jerking on 10 mg/hr of versed. RN to follow up.

## 2019-05-01 NOTE — PLAN OF CARE
Problem: MECHANICAL VENTILATION  Goal: Patient will maintain patent airway  Outcome: Ongoing  Goal: Oral health is maintained or improved  Outcome: Ongoing  Goal: ET tube will be managed safely  Outcome: Ongoing  Goal: Ability to express needs and understand communication  Outcome: Ongoing  Goal: Mobility/activity is maintained at optimum level for patient  Outcome: Ongoing     Problem: Confusion - Acute:  Goal: Absence of continued neurological deterioration signs and symptoms  Description  Absence of continued neurological deterioration signs and symptoms  Outcome: Ongoing  Goal: Mental status will be restored to baseline  Description  Mental status will be restored to baseline  Outcome: Ongoing     Problem: Discharge Planning:  Goal: Ability to perform activities of daily living will improve  Description  Ability to perform activities of daily living will improve  Outcome: Ongoing  Goal: Participates in care planning  Description  Participates in care planning  Outcome: Ongoing     Problem: Injury - Risk of, Physical Injury:  Goal: Absence of physical injury  Description  Absence of physical injury  Outcome: Ongoing  Goal: Will remain free from falls  Description  Will remain free from falls  Outcome: Ongoing     Problem: Mood - Altered:  Goal: Mood stable  Description  Mood stable  Outcome: Ongoing  Goal: Absence of abusive behavior  Description  Absence of abusive behavior  Outcome: Ongoing  Goal: Verbalizations of feeling emotionally comfortable while being cared for will increase  Description  Verbalizations of feeling emotionally comfortable while being cared for will increase  Outcome: Ongoing     Problem: Psychomotor Activity - Altered:  Goal: Absence of psychomotor disturbance signs and symptoms  Description  Absence of psychomotor disturbance signs and symptoms  Outcome: Ongoing     Problem: Sensory Perception - Impaired:  Goal: Demonstrations of improved sensory functioning will increase  Description  Demonstrations of improved sensory functioning will increase  Outcome: Ongoing  Goal: Decrease in sensory misperception frequency  Description  Decrease in sensory misperception frequency  Outcome: Ongoing  Goal: Able to refrain from responding to false sensory perceptions  Description  Able to refrain from responding to false sensory perceptions  Outcome: Ongoing  Goal: Demonstrates accurate environmental perceptions  Description  Demonstrates accurate environmental perceptions  Outcome: Ongoing  Goal: Able to distinguish between reality-based and nonreality-based thinking  Description  Able to distinguish between reality-based and nonreality-based thinking  Outcome: Ongoing  Goal: Able to interrupt nonreality-based thinking  Description  Able to interrupt nonreality-based thinking  Outcome: Ongoing     Problem: Sleep Pattern Disturbance:  Goal: Appears well-rested  Description  Appears well-rested  Outcome: Ongoing     Problem: Falls - Risk of:  Goal: Absence of physical injury  Description  Absence of physical injury  Outcome: Ongoing  Goal: Will remain free from falls  Description  Will remain free from falls  Outcome: Ongoing     Problem: Risk for Impaired Skin Integrity  Goal: Tissue integrity - skin and mucous membranes  Description  Structural intactness and normal physiological function of skin and  mucous membranes.   Outcome: Ongoing     Problem: Nutrition  Goal: Optimal nutrition therapy  Outcome: Ongoing

## 2019-05-01 NOTE — PROGRESS NOTES
The tube was secured with an endotracheal tube mccabe. The endotracheal tube was moved and the skin assessed. Skin assessment revealed problems - some redness. Endotracheal tube was taped at the  27 cm kyaw. Oral gastric tube was retaped to the endotracheal tube. Endotracheal tube mccabe was applied on May 1.  Action steps for any skin breakdown:     YAAKOV FRANCIS  12:30 PM

## 2019-05-01 NOTE — PLAN OF CARE
Problem: MECHANICAL VENTILATION  Goal: Patient will maintain patent airway  5/1/2019 0923 by Antoine Baptiste RN  Outcome: Ongoing  5/1/2019 0742 by Sukhjindre Espitia RCP  Outcome: Ongoing  5/1/2019 0647 by Fabien Matos RN  Outcome: Ongoing  Goal: Oral health is maintained or improved  5/1/2019 0923 by Antoine Baptiste RN  Outcome: Ongoing  5/1/2019 0742 by Sukhjinder Espitia RCP  Outcome: Ongoing  5/1/2019 0647 by Fabien Matos RN  Outcome: Ongoing  Goal: ET tube will be managed safely  5/1/2019 0923 by Antoine Baptiste RN  Outcome: Ongoing  5/1/2019 0742 by Sukhjinder Espitia RCP  Outcome: Ongoing  5/1/2019 0647 by Fabien Matos RN  Outcome: Ongoing  Goal: Ability to express needs and understand communication  5/1/2019 0923 by Antoine Baptiste RN  Outcome: Ongoing  5/1/2019 0742 by Sukhjinder Espitia RCP  Outcome: Ongoing  5/1/2019 0647 by Fabien Matos RN  Outcome: Ongoing  Goal: Mobility/activity is maintained at optimum level for patient  5/1/2019 0923 by Antoine Baptiste RN  Outcome: Ongoing  5/1/2019 0742 by Sukhjinder Espitia RCP  Outcome: Ongoing  5/1/2019 0647 by Fabien Matos RN  Outcome: Ongoing     Problem: Confusion - Acute:  Goal: Absence of continued neurological deterioration signs and symptoms  Description  Absence of continued neurological deterioration signs and symptoms  5/1/2019 0923 by Antoine Baptiste RN  Outcome: Ongoing  5/1/2019 0647 by Fabien Matos RN  Outcome: Ongoing  Goal: Mental status will be restored to baseline  Description  Mental status will be restored to baseline  5/1/2019 0923 by Antoine Baptiste RN  Outcome: Ongoing  5/1/2019 0647 by Fabien Matos RN  Outcome: Ongoing     Problem: Discharge Planning:  Goal: Ability to perform activities of daily living will improve  Description  Ability to perform activities of daily living will improve  5/1/2019 0923 by Antoine Baptiste RN  Outcome: Ongoing  5/1/2019 0647 by Fabien Matos membranes  Description  Structural intactness and normal physiological function of skin and  mucous membranes.   5/1/2019 0923 by Alex Ahmadi RN  Outcome: Ongoing  5/1/2019 0647 by Gaby Sanchez RN  Outcome: Ongoing     Problem: Nutrition  Goal: Optimal nutrition therapy  5/1/2019 0923 by Alex Ahmadi RN  Outcome: Ongoing  5/1/2019 0647 by Gaby Sanchez RN  Outcome: Ongoing

## 2019-05-01 NOTE — PROGRESS NOTES
NEUROLOGY INPATIENT PROGRESS NOTE    5/1/2019         Subjective: Ibrahima Martinez is a  81 y.o. male admitted on 4/29/2019 with Cardiac arrest [facility]) [I46.9]    Briefly, this is a  81 y.o. male admitted on 4/29/2019 with    VSS  Hyperkalemic  2-D echo was done which showed moderate LVH, ejection fraction 45%, mild atrial dilation, aortic sclerosis, no stenosis, mild AI along with small pericardial effusion   Intubated, sedated, off nimbex  No acute events overnight        No current facility-administered medications on file prior to encounter. Current Outpatient Medications on File Prior to Encounter   Medication Sig Dispense Refill    atorvastatin (LIPITOR) 40 MG tablet Take 40 mg by mouth daily      Cholecalciferol (VITAMIN D3) 2000 units CAPS Take 1 capsule by mouth 2 times daily      allopurinol (ZYLOPRIM) 100 MG tablet   Take 100 mg by mouth daily   0    oxybutynin (DITROPAN) 5 MG tablet   Take 5 mg by mouth 2 times daily Indications: Patient is only taking this once a day       carvedilol (COREG) 6.25 MG tablet   Take 12.5 mg by mouth 2 times daily (with meals) Indications: Patient is taking 6.25mg po twice daily       famotidine (PEPCID) 20 MG tablet Take 20 mg by mouth 2 times daily.  ferrous sulfate 325 (65 FE) MG tablet Take 325 mg by mouth 2 times daily.  tamsulosin (FLOMAX) 0.4 MG capsule   Take 0.4 mg by mouth 2 times daily          Allergies: Ibrahima Martinez has No Known Allergies.     Past Medical History:   Diagnosis Date    Acute bronchitis     Anemia     Bacteremia     Benign essential HTN     CKD (chronic kidney disease) stage 3, GFR 30-59 ml/min (HCC)     GERD (gastroesophageal reflux disease)     Gout     Renal artery stenosis (HCC)     Renal cell carcinoma (HCC)     UTI (lower urinary tract infection)     Volume depletion        Past Surgical History:   Procedure Laterality Date    TOTAL NEPHRECTOMY      right       Medications:     insulin regular  10 Units Intravenous Once    And    dextrose  25 g Intravenous Once    amLODIPine  5 mg Oral Daily    aspirin  81 mg Oral Daily    levetiracetam  500 mg Intravenous Q12H    sodium chloride flush  10 mL Intravenous 2 times per day    famotidine (PEPCID) injection  20 mg Intravenous BID    chlorhexidine  15 mL Mouth/Throat BID     PRN Meds include: morphine **OR** morphine, heparin (porcine), heparin (porcine), sodium chloride flush, magnesium hydroxide, ondansetron, potassium chloride, magnesium sulfate, ondansetron    Objective:   BP (!) 106/50   Pulse 77   Temp 95.7 °F (35.4 °C) (Core)   Resp (!) 3   Ht 6' 0.05\" (1.83 m)   Wt (!) 320 lb 5.3 oz (145.3 kg)   SpO2 98%   BMI 43.39 kg/m²     Blood pressure range: Systolic (28ZSG), BTA:868 , Min:101 , CAP:570   ; Diastolic (46HBX), WFP:73, Min:47, Max:75      ROS:  Cannot be tested    NEUROLOGIC EXAMINATION  GENERAL  Intubated and sedated    HEENT  NC/ AT   HEART  S1 and S2 heard; palpation of pulses: radial pulse    NECK  Supple and no bruits heard   MENTAL STATUS: Intubated  and sedated    CRANIAL NERVES: Cannot be tested    MOTOR FUNCTION: Cannot be  tested    SENSORY FUNCTION: Cannot be  tested    CEREBELLAR FUNCTION: Cannot be  tested    REFLEX FUNCTION:  Symmetric in upper and lower extremities, no Babinski sign   STATION and GAIT  Cannot be  tested      Data:    Lab Results:   CBC:   Recent Labs     04/30/19  0518 04/30/19  1137 04/30/19  1726 05/01/19  0002 05/01/19  0602   WBC 9.3 9.5  --   --  10.9   HGB 12.5* 12.2* 12.3* 12.1* 11.6*   PLT See Reflexed IPF Result See Reflexed IPF Result  --   --  See Reflexed IPF Result     BMP:    Recent Labs     04/30/19  1726 05/01/19  0002 05/01/19  0147 05/01/19  0421 05/01/19  0602     --  138  --   --  140   K 4.4   < > 4.7 4.7 5.6* 4.5   *  --  110*  --   --  113*   CO2 18*  --  19*  --   --  20   BUN 24*  --  23  --   --  23   CREATININE 0.95  --  1.01  --   --  1.06   GLUCOSE 131*  --  126*  --   -- 130*    < > = values in this interval not displayed. Lab Results   Component Value Date     (H) 05/01/2019    AST 69 (H) 05/01/2019    INR 1.0 04/29/2019       No results found for: PHENYTOIN, PHENYTOIN, VALPROATE, CBMZ    IMAGING  CT head showed no acute intracranial abnormality. Comminuted C1 fracture.     Baseline EEG  was not normal so patient started on LTME    First 1 hour recording showed generalized periodic pattern, there were generalized spikes/polyspikes at low amplitude (<30uV), once to twice in one second. Bifrontally predominant. (Generalized periodic epileptiform discharges GPEDs). There was no posterior dominant rhythm, no eye opening/closing, no spontaneous movement. PLAN  Cardiac arrest on hypothermia protocol  On versed, nimbex, rewarming      2. Myoclonic jerks -   Baseline EEG  was not normal so patient started on LTME  First 1 hour recording showed generalized periodic pattern, there were generalized spikes/polyspikes at low amplitude (<30uV), once to twice in one second. Bifrontally predominant. (Generalized periodic epileptiform discharges GPEDs). There was no posterior dominant rhythm, no eye opening/closing, no spontaneous movement. LTM E monitoring  Follow up MRI Brain    Noted with 1000 mg per hour, continue 500 mg twice a day    3. Leukocytosis-s resolved   Follow-up pan  culture     4. Elevated troponin  Likely type 2 MI   Started on low intensity heparin and aspirin    2D echo done showed 2-D echo was done which showed moderate LVH, ejection fraction 45%, mild atrial dilation, aortic sclerosis, no stenosis, mild AI along with small pericardial effusion  Need ischemic work up later      5.  C1 fracture   Neurosurgery on board  follow up MRI brain and spine

## 2019-05-02 NOTE — PLAN OF CARE
Problem: MECHANICAL VENTILATION  Goal: Patient will maintain patent airway  5/2/2019 0134 by Nella Jason RN  Outcome: Ongoing  5/1/2019 1942 by Loi Pepe RCP  Outcome: Ongoing  Goal: Oral health is maintained or improved  5/2/2019 0134 by Nella Jason RN  Outcome: Ongoing  5/1/2019 1942 by Loi Pepe RCP  Outcome: Ongoing  Goal: ET tube will be managed safely  5/2/2019 0134 by Nella Jason RN  Outcome: Ongoing  5/1/2019 1942 by Loi Pepe RCP  Outcome: Ongoing  Goal: Ability to express needs and understand communication  5/2/2019 0134 by Nella Jason RN  Outcome: Ongoing  5/1/2019 1942 by Loi Pepe RCP  Outcome: Ongoing  Goal: Mobility/activity is maintained at optimum level for patient  5/2/2019 0134 by Nella Jason RN  Outcome: Ongoing  5/1/2019 1942 by Loi Pepe RCP  Outcome: Ongoing     Problem: Confusion - Acute:  Goal: Absence of continued neurological deterioration signs and symptoms  Outcome: Ongoing  Goal: Mental status will be restored to baseline  Outcome: Ongoing     Problem: Discharge Planning:  Goal: Ability to perform activities of daily living will improve  Outcome: Ongoing  Goal: Participates in care planning  Outcome: Ongoing     Problem: Injury - Risk of, Physical Injury:  Goal: Absence of physical injury  Outcome: Ongoing  Goal: Will remain free from falls  Outcome: Ongoing     Problem: Mood - Altered:  Goal: Mood stable  Outcome: Ongoing  Goal: Absence of abusive behavior  Outcome: Ongoing  Goal: Verbalizations of feeling emotionally comfortable while being cared for will increase  Outcome: Ongoing     Problem: Psychomotor Activity - Altered:  Goal: Absence of psychomotor disturbance signs and symptoms  Outcome: Ongoing     Problem: Sensory Perception - Impaired:  Goal: Demonstrations of improved sensory functioning will increase  Outcome: Ongoing  Goal: Decrease in sensory misperception frequency  Outcome: Ongoing  Goal: Able to refrain from responding to false sensory perceptions  Outcome: Ongoing  Goal: Demonstrates accurate environmental perceptions  Outcome: Ongoing  Goal: Able to distinguish between reality-based and nonreality-based thinking  Outcome: Ongoing  Goal: Able to interrupt nonreality-based thinking  Outcome: Ongoing     Problem: Sleep Pattern Disturbance:  Goal: Appears well-rested  Outcome: Ongoing     Problem: Falls - Risk of:  Goal: Absence of physical injury  Outcome: Ongoing  Goal: Will remain free from falls  Outcome: Ongoing     Problem: Risk for Impaired Skin Integrity  Goal: Tissue integrity - skin and mucous membranes  Outcome: Ongoing     Problem: Nutrition  Goal: Optimal nutrition therapy  Outcome: Ongoing

## 2019-05-02 NOTE — PROCEDURES
Patient extubated per physician order. Patient extubated in usual fashion. Patient placed on  2 liters/min via nasal cannula.      Juju Iglesias   12:25 PM

## 2019-05-02 NOTE — PROGRESS NOTES
Nutrition Assessment    Type and Reason for Visit: Reassess    Pt remains intubated. Code status has been changed to [facility] with plans for comfort care. RD will sign off at this time. Please consult as needed.     Electronically signed by Brisa Willis RD, LD on 5/2/19 at 11:44 AM    Contact Number: [phone]

## 2019-05-02 NOTE — PROGRESS NOTES
Brief history: Zana Melgar is a 81 y.o. old male admitted on 4/29/2019 with anoxic brain injury       Subjective: No new neurological events overnight. Overnight patient had twitching in his extremities and abdomen      Objective: /63   Pulse 70   Temp 98.1 °F (36.7 °C) (Oral)   Resp 20   Ht 6' 0.05\" (1.83 m)   Wt (!) 343 lb 11.2 oz (155.9 kg)   SpO2 93%   BMI 46.55 kg/m²       Medications:          Mental status   Patient is intubated. Continuous twitching in both eyelids    Cranial nerves   Pupil response: Present 2.6 mm bilaterally and right more than left. Corneal Reflex: Absent  Oculocephalic reflex: Not tested   Cough reflex: Not tested     Motor and sensory function  No motor response   DTR Intact symmetric  Babinski Absent   Gait Not tested      Assessment recommendations: Anoxic brain injury    Lack of neurological improvement, suppressed EEG pattern with generalized periodic discharges and continuous myoclonic jerks are poor prognostic signs. I had extensive discussion with patient's daughters and son at the bedside. Questions were answered. The family understands that prognosis is poor. Repeat CT scan of the head has been reviewed. Limited by artifact. Family is leaning towards comfort care. This note is created with the assistance of a speech-recognition program. While intending to generate a document that actually reflects the content of the visit, the document can still have some errors including those of syntax and sound a- like substitutions which may escape proofreading. In such instances, actual meaning can be extrapolated by contextual derivation.

## 2019-05-02 NOTE — PLAN OF CARE
Problem: MECHANICAL VENTILATION  Goal: Patient will maintain patent airway  5/2/2019 0803 by Dennys Tipton RCP  Outcome: Ongoing     Problem: MECHANICAL VENTILATION  Goal: Oral health is maintained or improved  5/2/2019 0803 by Dennys Tipton RCP  Outcome: Ongoing     Problem: MECHANICAL VENTILATION  Goal: ET tube will be managed safely  5/2/2019 0803 by Dennys Tipton RCP  Outcome: Ongoing     Problem: MECHANICAL VENTILATION  Goal: Ability to express needs and understand communication  5/2/2019 0803 by Dennys Tipton RCP  Outcome: Ongoing     Problem: MECHANICAL VENTILATION  Goal: Mobility/activity is maintained at optimum level for patient  5/2/2019 0803 by Dennys Tipton RCP  Outcome: Ongoing

## 2019-05-02 NOTE — CONSULTS
Palliative Care Inpatient Consult    [facility]:  [id number]  AGE: 81 y.o. GENDER: male  : 1935  TODAY'S DATE:  2019    Reasons for Consultation:    Symptom and/or pain management  Provision of information regarding PC and/or hospice philosophies  Complex, time-intensive communication and interdisciplinary psychosocial support  Clarification of goals of care and/or assistance with difficult decision-making  Guidance in regards to resources and transition(s)      History of Present Illness     The patient is a 81 y.o. Non-/non  male who presents with Cardiac Arrest      Referred to Palliative Care by   ? Physician        He was admitted to the critical care service for Cardiac arrest ([facility] [I46.9]. His hospital course has been associated with Cardiac arrest [facility]). The patient has a complicated medical history and has been hospitalized since 2019 12:02 PM.  Patient with past medical history significant for hypertension, hyperlipidemia, nephrectomy for history of renal cell carcinoma. Patient intubated and sedated during time of evaluation. Information gathered from patient's family, nursing, and chart review. Per, Pj Mercado, wife, she was cooking when she heard a loud noise. She realizes that the patient had fallen. She called 911. EMS arrived at the scene after about 10 minutes where they intubated the patient gave him 2 rounds of epi and CPR before ROSC was achieved. On arrival, patient found to have been in PEA arrest, for which she underwent hypothermia protocol. Cardiology evaluated the patient and suggested heart cath if neurologic recovery. Neurology evaluated patient and determined that EEG was consistent with anoxic brain injury. Neurosurgery is not planning any intervention. Patient found to have a C1 fracture on CT neck. CT head consistent with anoxic brain injury. Patient's echo revealed an ejection fraction of 45%. Palliative care consulted by critical care to meet with family and illicit goals of care. Active Hospital Problems    Diagnosis Date Noted    C1 cervical fracture ([facility] [S12.000A] 05/01/2019    Anoxic brain injury ([facility] [G93.1]     Myoclonic jerking [G25.3] 04/30/2019    Bradycardia [R00.1] 04/30/2019    Card[facility] ([facility] [I46.9] 04/29/2019    Benign essential HTN [I10]        PAST MEDICAL HISTORY      Diagnosis Date    Acute bronchitis     Anemia     Bacteremia     Benign essential HTN     CKD (chronic kidney disease) stage 3, GFR 30-59 ml/min (HCC)     GERD (gastroesophageal reflux disease)     Gout     Renal artery stenosis (HCC)     Renal cell carcinoma (HCC)     UTI (lower urinary tract infection)     Volume depletion        PAST SURGICAL HISTORY  Past Surgical History:   Procedure Laterality Date    TOTAL NEPHRECTOMY      right       SOCIAL HISTORY  Social History     Tobacco Use    Smoking status: Current Some Day Smoker     Types: Cigars   Substance Use Topics    Alcohol use: No    Drug use: No       ALLERGIES  No Known Allergies      MEDICATIONS  Current Medications    morphine  4 mg Intravenous Once    LORazepam  1 mg Intravenous Once     morphine **OR** morphine, LORazepam **OR** LORazepam, Glycopyrrolate  IV Drips/Infusions    Home Medications  No current facility-administered medications on file prior to encounter.       Current Outpatient Medications on File Prior to Encounter   Medication Sig Dispense Refill    atorvastatin (LIPITOR) 40 MG tablet Take 40 mg by mouth daily      Cholecalciferol (VITAMIN D3) 2000 units CAPS Take 1 capsule by mouth 2 times daily      allopurinol (ZYLOPRIM) 100 MG tablet   Take 100 mg by mouth daily   0    oxybutynin (DITROPAN) 5 MG tablet   Take 5 mg by mouth 2 times daily Indications: Patient is only taking this once a day       carvedilol (COREG) 6.25 MG tablet   Take 12.5 mg by mouth 2 times daily (with meals) Indications: Patient is taking 6.25mg po twice daily       famotidine (PEPCID) 20 MG tablet Take 20 mg by mouth 2 times daily.  ferrous sulfate 325 (65 FE) MG tablet Take 325 mg by mouth 2 times daily.  tamsulosin (FLOMAX) 0.4 MG capsule   Take 0.4 mg by mouth 2 times daily          Data         /62   Pulse 70   Temp 98.1 °F (36.7 °C) (Oral)   Resp 20   Ht 6' 0.05\" (1.83 m)   Wt (!) 343 lb 11.2 oz (155.9 kg)   SpO2 93%   BMI 46.55 kg/m²     Wt Readings from Last 3 Encounters:   05/02/19 (!) 343 lb 11.2 oz (155.9 kg)   08/04/15 (!) 310 lb (140.6 kg)   11/21/14 (!) 313 lb 12.8 oz (142.3 kg)        Code Status: DNR-CC     ADVANCED CARE PLANNING:  Patient has capacity for medical decisions: no  Health Care Power of : yes  Living Will: no     Personal, Social, and Family History  Marital Status:   Living situation:with family:  spouse  Importance of delia/Orthodoxy/spiritual beliefs: ? Very ? Somewhat X Not   Psychological Distress:   Does patient understand diagnosis/treatment? no  Does caregiver understand diagnosis/treatment? yes      Assessment        REVIEW OF SYSTEMS: Unable to obtain as patient intubated and sedated      PHYSICAL ASSESSMENT:  Constitutional: Intubated and sedated  Head: Abrasions noted  Eyes: Pupils pinpoint and not reactive to light bilaterally  Neck: C-collar  Cardiovascular: Bradycardic  Pulmonary/Chest: Mechanical breath sounds  Abdomen: Soft. Musculoskeletal: . No edema lower ext. Neurological:  focal neurological deficits   Skin: Bruising and lower extremity pallor, with dusky extremities    Palliative Performance Scale:  ___60%  Ambulation reduced; Significant disease; Can't do hobbies/housework; intake normal or reduced; occasional assist; LOC full/confusion  ___50%  Mainly sit/lie;  Extensive disease; Can't do any work; Considerable assist; intake normal or reduced; LOC full/confusion  ___40%  Mainly in bed; Extensive disease; Mainly assist; intake normal or reduced; LOC full/confusion   ___30%  Bed Bound; Extensive disease; Total care; intake reduced; LOCfull/confusion  ___20%  Bed Bound; Extensive disease; Total care; intake minimal; Drowsy/coma  __X_10%  Bed Bound; Extensive disease; Total care; Mouth care only; Drowsy/coma  ___0       Death      Plan      Palliative Interaction:    - Family meeting had with myself, nurse, patient's wife, [facility]), and patient's children and and in-laws. - Family voiced understanding of poor prognosis and likely poor chances of recovery back to patient's original baseline.  - Family voiced that patient would not want to be dependent on life support. - Family and POA gave consent to change code status to DNR CC. Family stated that patient would not want to undergo aggressive cardiac or pulmonary resuscitation in the event of cardiac or pulmonary arrest.  - Family stated that there primary goal is to have patient remain comfortable. - Questions were answered that family had in regards to next steps. - Plan is to proceed with terminal extubation at family's leisure. - Orders placed for terminal extubation.  - Orders for glycopyrrolate 0.2 mg IV for secretion PRN  - Orders placed for Ativan 0.5 mg or 1 mg IV for restlessness, agitation  - Orders placed for 2 mg or 4 mg of morphine PRN for pain, SOB  - Wean levo fed and Versed per protocol when family ready for extubation  - 1 time order of morphine 4 mg IV and 1 mg Ativan IV prior to extubation placed  - Discussed case with primary service and nurse  - Poor prognosis anticipated. If patient's vitals stable after extubation can consider hospice consultation.       Education/support to staff  Education/support to family  Communications with primary service  Pharmacologic pain management  Managing anticipatory grief  Code status clarified: [facility]  Medications to decrease non-pain symptoms  Withdrawal of life support intervention    Stepwise withdrawal of futile treatments  Principle Problem/Diagnosis:  Cardiac arrest [facility])    Additional Assessments:     1- Symptom management/ pain control     Pain Assessment:  The patient is not having any pain. Anxiety:  none                          Dyspnea:  acute dyspnea                          Fatigue:  exercise intolerance    Other: We feel the patient symptoms are being controlled. his current regimen is reviewed by myself and discussed with the staff. We recommend adjusting his medications as noted above    2- Goals of care evaluation   The patient goals of care are preparation for death and achievement of a peaceful death   Goals of care discussed with:    ? Patient independently    ? Patient and Family    X Family or Healthcare DPOA independently    ? Unable to discuss with patient, family/DPOA not present    3- Code Status  DNR-CC    4- Other recommendations   - We will continue to provide comfort and support to the patient and the family  Please call with any palliative questions or concerns. Palliative Care Team is available via perfect serve or via phone. Palliative Care will continue to follow Mr. Price's care as needed. Thank you for allowing Palliative Care to participate in the care of Mr. Nelli Elam . This note has been dictated by dragon, typing errors may be a possibility.       Electronically signed by   Heather Maldonado MD  Palliative Care Team  on 5/2/2019 at 11:44 AM    Palliative care office: [phone]

## 2019-05-02 NOTE — PROGRESS NOTES
Ventilator Bronchodilator assessment    Breath sounds: rhonchi - clears with suctioning  Inspiratory Pressure: 22  Plateau Pressure: 18    Patient assessed at level 1          [x]    Bronchodilator Assessment    BRONCHODILATOR ASSESSMENT SCORE  Score 0 (Home) 1 2 3 4   Breath Sounds   []  Chronic Ventilator: Patient at baseline [x]  Mild Wheezes/ Clear []  Intermittent wheezes with good air entry []  Bilateral/unilateral wheezing with diminished air entry []  Insp/Exp wheeze and/or poor aeration   Ventilator Pressures   []  Chronic Ventilator [x]  Insp. Pressure less than 25 cm H20 []  Insp. Pressure less than 25 cm H20 []  Insp. Pressure exceeds 25 cm H20 []  Insp.  Pressure exceeds 30 cm H20   Plateau Pressure []  NA   [x]  Plateau Pressure less than 4  []  Plateau Pressure less than or equal to 5 []  Plateau Pressure greater than or equal to 6 []  Plateau Pressure greater than or equal to 8       JOSE F GOLDSMITH  8:05 AM

## 2019-05-02 NOTE — FLOWSHEET NOTE
[facility]   Patient Death Note  DEATH   Shift date: 19    Shift day: Thursday  Shift #: 1                 Room # 0106/0106-01   Name: Gabi Gomez            Age: 81 y.o. Gender: male          Restorationist: [address] of Mu-ism: in [address] [place] [address] Date & Time: 2019 12:02 PM     Referral: called by RN   Actual date of death: 19   TOD: 12:32       SITUATION AT DEATH:  Per report, pt arrived @ hospital on  after suffering cardiac arrest & fall in his home. Pt also reportedly experienced C1-2 fxs. Pt was placed on cooling protocol. Earlier today, decision reportedly made by family to change code status & move to terminal wean. Per report, pt extubated @ 12:25 today. Time of Death given as 12:32. IS THIS A 'S CASE? Yes    SPIRITUAL/EMOTIONAL INTERVENTION:   called by RN after pt had , explaining that \"things had happened very quickly. \"  stepped into room where pt's daughter-in-law & gr-daughter Vicki were @ bedside. Vicki reported that she had said a prayer & sprinkled pt w/ holy water when all were gathered around bed.  provided support to women who told him that pt's wife Farooq Cain & other family (including pt's 3 sons & 1 daughter) were all in the ICU waiting area.  met family in waiting area, offered support & sympathy.  was able to get name of Polo Johnson from family, have Farooq Cain sign Release of Body Form & give [address] . Family expressed no further needs for spiritual/ emotional support @ that time. Family Received Grief Packet? Yes     DOCTOR SIGNING DEATH NOTE:  's case     spoke with Jv Dumont, who will contact the  home    Copy of COMPLETED Release of Body Form Received? Yes     HOME:  Name: Leigh Ann [address]: [place]  Phone Number: [phone]    NEXT OF KIN:  Name: Stephanie Boyd  Relationship: Wife  Street Address: [address] [place] [address]. [facility] [address]: [place]  Zip code: [address]   Phone Number: [phone]    Or son Darrels [address] [address]? No    IF SO, WHAT? N/A    Electronically signed by Rev. Fidel Rousseau, [facility], on 5/2/2019 at 1:21 PM.  Hunter Garcia  [phone]     05/02/19 1316   Encounter Summary   Services provided to: Family  (@ bedside & in waiting room)   Referral/Consult From: Nurse   Support System Spouse; Children;Family members   Place of [address] [place] [address] Completed   Continue Visiting   (5/2)   Complexity of Encounter High   Grief and Life Adjustment   Type Death;Palliative care   Assessment Calm; Approachable;Grieving;Coping   Intervention Sustaining presence/ Ministry of presence; Active listening;Explored feelings, thoughts, concerns;Explored coping resources; Discussed belief system/Confucianism practices/delia;Grief care;Nurtured hope   Outcome Receptive;Coping;Encouraged;Engaged in conversation;Comfort;Expressed gratitude

## 2019-05-02 NOTE — PROGRESS NOTES
ICU DEATH NOTE    PATIENT NAME: Kelli Ortega  YOB: 1935  MEDICAL RECORD NO. [id number]  DATE: 5/2/2019  PRIMARY CARE PHYSICIAN: Vera Way DO    DIAGNOSIS OF DEATH     I have confirmed the death of this patient in accordance with accepted medical standards. The patient is dead as evidenced by cardiac death or cessation of brain function:    Cardiac Death (check all that apply):     [x]  Absence of respiratory effort by observation     [x]  Absence of pulse by palpation     [x]  Absence of blood pressure by sphygmomanometry     [x]  Absence of sustainable cardiac rhythm by monitor    Death by Cessation of Brain Function (check all that apply):     [x]  Absence of Cerebral Function with no motor response      [x]  Absence of brain stem function by systemic physical exam     []  Failure to respond with respiratory drive by apnea test     []  Cerebral Electrical silence as interpreted by qualified reader        OR     []  Absence of brain blood flow by radiologic technique      CERTIFICATION OF DEATH     I have pronounced the patient dead on:     Date: 5/2/2019 at 12:32 PM     cause of death ;    Anoxic  brain injury after cardiac arrest due to MI   NOTIFICATIONS     Attending physician that will sign Death Certificate: Dr. Tali Head notified: Name and/or Relationship: Family at bedside including wife and sons                                                         []  Per Nursing     notified (Name):                                                         [x]  Per [address], MD  Critical Care Resident,   Department of Internal Medicine/ Critical care  Johnnie Barnes, [facility] ([facility]   5/2/2019, 12:36 PM

## 2019-05-02 NOTE — PROGRESS NOTES
INTENSIVE CARE UNIT  Resident Physician Progress Note    Patient - Chad Watson  Date of Admission -  2019 12:02 PM  Date of Evaluation -  2019  Room and Bed Number -  0106/0106-01   Hospital Day - 3      SUBJECTIVE:     OVERNIGHT EVENTS:     Unresponsive. Noted to have intermittent seizure-like activity.        TODAY:      AWAKE & FOLLOWING COMMANDS:  [x] No   [] Yes    SECRETIONS Amount:  [] Small [] Moderate  [] Large  [] None  Color:     [] White [] Colored  [] Bloody    SEDATION:  RAAS Score:  [] Propofol gtt  [x] Versed gtt  [] Ativan gtt   [] No Sedation    PARALYZED:  [] No    [] Yes    VASOPRESSORS:  [] No    [] Yes  [x] Levophed [] Dopamine [] Vasopressin  [] Dobutamine [] Phenylephrine [] Epinephrine      OBJECTIVE:     VITAL SIGNS:  BP (!) 124/54   Pulse 74   Temp 97.7 °F (36.5 °C) (Esophageal)   Resp 19   Ht 6' 0.05\" (1.83 m)   Wt (!) 343 lb 11.2 oz (155.9 kg)   SpO2 99%   BMI 46.55 kg/m²   Tmax over 24 hours:  Temp (24hrs), Av.4 °F (36.3 °C), Min:96.8 °F (36 °C), Max:97.7 °F (36.5 °C)      Patient Vitals for the past 8 hrs:   BP Temp Temp src Pulse Resp SpO2 Weight   19 0700 -- -- -- 74 19 99 % --   19 0645 -- -- -- 75 19 97 % --   19 0636 -- -- -- 78 18 98 % --   19 0600 (!) 124/54 -- -- 82 12 98 % --   19 0545 -- -- -- -- -- 97 % --   19 0534 -- -- -- -- -- -- (!) 343 lb 11.2 oz (155.9 kg)   19 0530 -- -- -- 77 -- 99 % --   19 0515 -- -- -- 77 -- 98 % --   19 0500 (!) 130/58 -- -- 77 18 97 % --   19 0445 -- -- -- 76 18 97 % --   19 0430 -- -- -- 76 15 97 % --   19 0415 -- -- -- 76 16 98 % --   19 0400 (!) 104/51 97.7 °F (36.5 °C) Esophageal 83 16 97 % --   19 0345 -- -- -- 79 16 98 % --   19 0336 -- -- -- 79 11 98 % --   19 0330 -- -- -- 79 10 94 % --   19 0315 -- -- -- 78 16 97 % --   19 0300 (!) 120/58 -- -- 80 14 98 % --   19 0245 -- -- -- 78 15 98 % --   19 0230 -- -- -- 78 15 99 % --   05/02/19 0215 -- -- -- 78 16 97 % --   05/02/19 0200 (!) 129/45 -- -- 80 12 99 % --   05/02/19 0145 -- -- -- 73 15 99 % --   05/02/19 0130 -- -- -- 81 16 99 % --   05/02/19 0115 -- -- -- 80 22 98 % --   05/02/19 0100 (!) 130/52 -- -- 77 17 98 % --   05/02/19 0045 -- -- -- 84 23 -- --   05/02/19 0030 -- -- -- -- -- 99 % --   05/02/19 0015 -- -- -- -- -- 98 % --   05/02/19 0000 (!) 157/47 97.7 °F (36.5 °C) Esophageal 87 22 99 % --   05/01/19 2347 -- -- -- 78 23 98 % --   05/01/19 2315 -- -- -- 81 24 99 % --         Intake/Output Summary (Last 24 hours) at 5/2/2019 0705  Last data filed at 5/2/2019 3690  Gross per 24 hour   Intake 7266.34 ml   Output 340 ml   Net 6926.34 ml     Date 05/02/19 0000 - 05/02/19 2359   Shift 5240-1576 2370-2663 7219-0353 24 Hour Total   INTAKE   I.V.(mL/kg) 3575(22.9)   3575(22.9)   Shift Total(mL/kg) 3575(22.9)   3575(22.9)   OUTPUT   Urine(mL/kg/hr) 71   71   Emesis/NG output(mL/kg) 0(0)   0(0)   Shift Total(mL/kg) 71(0.5)   71(0.5)   Weight (kg) 155.9 155.9 155.9 155.9     Wt Readings from Last 3 Encounters:   05/02/19 (!) 343 lb 11.2 oz (155.9 kg)   08/04/15 (!) 310 lb (140.6 kg)   11/21/14 (!) 313 lb 12.8 oz (142.3 kg)     Body mass index is 46.55 kg/m². PHYSICAL EXAM:  GEN:  Intubated, sedated  EYES:  pupils equal, round, and reactive to light  LUNGS:  Ventilated breaths  CV:    Regular rhythm, normal rate, no murmurs  ABDOMEN:   Reduced bowel sounds, no tenderness, no palpable organomegaly  NEURO[de-identified]   Intubated and sedated, not responsive to commands.   SKIN:   No bruising or bleeding  EXTREMITIES:  No pedal or leg edema, no calf tenderness/swelling, no erythema, distal pulses intact       MEDICATIONS:  Scheduled Meds:   aspirin  81 mg Oral Daily    levetiracetam  500 mg Intravenous Q12H    sodium chloride flush  10 mL Intravenous 2 times per day    famotidine (PEPCID) injection  20 mg Intravenous BID    chlorhexidine  15 mL Mouth/Throat BID Continuous Infusions:   norepinephrine 5 mcg/min (05/02/19 0530)    heparin (porcine) 6.88 Units/kg/hr (05/02/19 0442)    sodium chloride 100 mL/hr at 05/02/19 0347    midazolam 9 mg/hr (05/02/19 0021)     PRN Meds:     morphine 2 mg Q2H PRN   Or     morphine 4 mg Q2H PRN   heparin (porcine) 4,000 Units PRN   heparin (porcine) 2,000 Units PRN   sodium chloride flush 10 mL PRN   magnesium hydroxide 30 mL Daily PRN   ondansetron 4 mg Q6H PRN   potassium chloride 10 mEq PRN   magnesium sulfate 1 g PRN   ondansetron 4 mg Q6H PRN       SUPPORT DEVICES: [x] Ventilator [] BIPAP  [] Nasal Cannula [] Room Air    VENT SETTINGS (Comprehensive) (if applicable):    Vent Information  $Ventilation: $Subsequent Day  Ventilator Started: Yes  Equipment Changed: HME  Vent Type: Servo i  Vent Mode: PRVC  Vt Ordered: 550 mL  Rate Set: (S) 20 bmp  FiO2 : 40 %  Sensitivity: 5  PEEP/CPAP: 5  I Time/ I Time %: 0.9 s  Cuff Pressure (cm H2O): 22 cm H2O  Humidification Source: HME  Additional Respiratory  Assessments  Pulse: 74  Resp: 19  SpO2: 99 %  End Tidal CO2: 40 (%)  Position: Semi-Tao's  Humidification Source: HME  Oral Care Completed?: Yes  Oral Care: Mouthwash, Mouth moisturizer, Mouth suctioned  Subglottic Suction Done?: Yes  Cuff Pressure (cm H2O): 22 cm H2O    ABGs:     Lab Results   Component Value Date    XDP6TNT 22 05/02/2019    FIO2 40.0 05/02/2019         DATA:  Complete Blood Count: Recent Labs     04/30/19  1137  05/01/19  0602  05/01/19  1537 05/02/19  0000 05/02/19  0434   WBC 9.5  --  10.9  --   --   --  19.0*   RBC 3.95*  --  3.89*  --   --   --  3.20*   HGB 12.2*   < > 11.6*   < > 11.3* 10.4* 9.9*   HCT 37.0*   < > 37.0*   < > 34.9* 33.7* 31.4*   MCV 93.7  --  95.1  --   --   --  98.1   MCH 30.9  --  29.8  --   --   --  30.9   MCHC 33.0  --  31.4  --   --   --  31.5   RDW 14.0  --  14.6*  --   --   --  15.0*   PLT See Reflexed IPF Result  --  See Reflexed IPF Result  --   --   --  116*   MPV NOT REPORTED  -- NOT REPORTED  --   --   --  11.5    < > = values in this interval not displayed. Last 3 Blood Glucose:   Recent Labs     04/29/19  1757 04/30/19  0518 04/30/19  1137 04/30/19  1726 05/01/19  0002 05/01/19  0602 05/01/19  1537 05/02/19  0434   GLUCOSE 164* 156* 129* 131* 126* 130* 146* 126*        PT/INR:    Lab Results   Component Value Date    PROTIME 10.7 04/29/2019    INR 1.0 04/29/2019     PTT:    Lab Results   Component Value Date    APTT 52.5 05/02/2019       Comprehensive Metabolic Profile:   Recent Labs     04/30/19  0518  05/01/19  0602 05/01/19  0901 05/01/19  1537 05/02/19  0434      < > 140  --  142 138   K 4.3   < > 4.5 4.4 4.3 4.5   *   < > 113*  --  115* 113*   CO2 19*   < > 20  --  18* 19*   BUN 22   < > 23  --  25* 27*   CREATININE 1.06   < > 1.06  --  1.22* 1.55*   GLUCOSE 156*   < > 130*  --  146* 126*   CALCIUM 8.3*   < > 7.7*  --  7.9* 7.7*   PROT 5.3*  --  5.0*  --   --  4.4*   LABALBU 3.0*  --  2.7*  --   --  2.2*   BILITOT 0.79  --  0.42  --   --  0.30   ALKPHOS 108  --  94  --   --  77   *  --  69*  --   --  25   *  --  231*  --   --  135*    < > = values in this interval not displayed.       Magnesium:   Lab Results   Component Value Date    MG 1.8 04/30/2019    MG 1.8 04/30/2019    MG 1.8 04/30/2019     Phosphorus:   Lab Results   Component Value Date    PHOS 2.9 04/30/2019    PHOS 2.8 04/30/2019    PHOS 2.9 04/30/2019     Ionized Calcium:   Lab Results   Component Value Date    CAION 1.16 04/30/2019    CAION 1.14 04/30/2019    CAION 1.15 04/30/2019        Urinalysis: Lab Results   Component Value Date    NITRU NEGATIVE 04/29/2019    COLORU YELLOW 04/29/2019    PHUR 6.0 04/29/2019    WBCUA 5 TO 10 04/29/2019    RBCUA 2 TO 5 04/29/2019    MUCUS NOT REPORTED 04/29/2019    TRICHOMONAS NOT REPORTED 04/29/2019    YEAST NOT REPORTED 04/29/2019    BACTERIA NOT REPORTED 04/29/2019    CLARITYU Clear 11/21/2014    SPECGRAV 1.010 04/29/2019    LEUKOCYTESUR NEGATIVE 04/29/2019    UROBILINOGEN Normal 04/29/2019    BILIRUBINUR NEGATIVE 04/29/2019    BLOODU Negative 11/21/2014    GLUCOSEU TRACE 04/29/2019    KETUA NEGATIVE 04/29/2019    AMORPHOUS NOT REPORTED 04/29/2019       HgBA1c:  No results found for: LABA1C  TSH:  No results found for: TSH  Lactic Acid:   Lab Results   Component Value Date    LACTA NOT REPORTED 04/30/2019    LACTA NOT REPORTED 04/29/2019      Troponin: No results for input(s): TROPONINI in the last 72 hours.     Microbiology:    Other Labs:  CBC with Differential:    Lab Results   Component Value Date    WBC 19.0 05/02/2019    RBC 3.20 05/02/2019    HGB 10.0 05/02/2019    HCT 32.1 05/02/2019     05/02/2019    MCV 98.1 05/02/2019    MCH 30.9 05/02/2019    MCHC 31.5 05/02/2019    RDW 15.0 05/02/2019    LYMPHOPCT 8 05/02/2019    MONOPCT 8 05/02/2019    BASOPCT 0 05/02/2019    MONOSABS 1.49 05/02/2019    LYMPHSABS 1.44 05/02/2019    EOSABS 0.04 05/02/2019    BASOSABS <0.03 05/02/2019    DIFFTYPE NOT REPORTED 05/02/2019     CMP:    Lab Results   Component Value Date     05/02/2019    K 4.5 05/02/2019     05/02/2019    CO2 19 05/02/2019    BUN 27 05/02/2019    CREATININE 1.55 05/02/2019    GFRAA 52 05/02/2019    LABGLOM 43 05/02/2019    GLUCOSE 126 05/02/2019    PROT 4.4 05/02/2019    LABALBU 2.2 05/02/2019    CALCIUM 7.7 05/02/2019    BILITOT 0.30 05/02/2019    ALKPHOS 77 05/02/2019    AST 25 05/02/2019     05/02/2019         Radiology/Imaging:  XR chest portable  No significant interval change with redemonstration of right greater than   left airspace disease and bilateral pleural effusions.             ASSESSMENT:     Patient Active Problem List    Diagnosis Date Noted    C1 cervical fracture ([facility] 05/01/2019    Anoxic brain injury ([facility] [facility]     Myoclonic jerking 04/30/2019    Bradycardia 04/30/2019    Cardiac arrest ([facility] [address] 04/29/2019    Gout     Anemia     GERD (gastroesophageal reflux disease)     Renal cell carcinoma (HCC)     Benign essential HTN     CKD (chronic kidney disease) stage 3, GFR 30-59 ml/min (HCC)     Volume depletion     Bacteremia     Acute bronchitis     Renal artery stenosis (HCC)           PLAN:     WEAN PER PROTOCOL:  [] No   [x] Yes  [] N/A    ICU PROPHYLAXIS:  Stress ulcer:  [] PPI Agent  [x] [id number] [] Sucralfate  [] Other:  VTE:   [] Enoxaparin  [x] Unfract. Heparin drip  [] EPC Cuffs    NUTRITION:  [x] NPO [] Tube Feeding (Specify: ) [] TPN  [] PO    HOME MEDS RECONCILED: [] No  [] Yes    CONSULTATION NEEDED:  [] No  [x] Yes    FAMILY UPDATED:    [] No  [x] Yes    TRANSFER OUT OF ICU:   [x] No  [] Yes            Plan:  1. Cardiac arrest with PEA. On ventilator. No interval improvement in clinical status. 2. Hypotension. On Levophed. Got about 3 L IVF since last night. 3. SHALA. Cr worsening. 1.55 today. Received IVF fluid bolus. 4. Anoxic brain injury with seizure-like activity. Neurology follow. Continue Keppra. 5. Shock. Possibly neurogenic. On IVF N/S and pressor support. 6. Mild Normocytic anemia. Hb currently stable. 7. C1 fracture. Neurosurgery follow. 8. Myoclonic jerks. On keppra. Neurology follow. Prognosis overall is poor. Patient family agree change of code status and discussing comfort care.       Isaias Nayak MD  PGY-1 Resident  Internal Medicine  [address] [facility]  5/2/2019 7:05 AM

## 2019-05-02 NOTE — CARE COORDINATION
Discharge [address] [facility] Case Management Department  Written by: Sanjeev Dawson RN    Patient Name: Keri Crockett  Attending Provider: Pillo Booth MD  Admit Date: 2019 12:02 PM  MRN: [id number]  Account: [de-identified]                     : 1935  Discharge Date:       Disposition:     Sanjeev Dawson RN

## 2019-05-05 LAB
CULTURE: NORMAL
Lab: NORMAL
SPECIMEN DESCRIPTION: NORMAL

## 2019-05-07 NOTE — DISCHARGE SUMMARY
[address]   Department of Internal Medicine - Staff Internal Medicine Service    INPATIENT ICU DEATH SUMMARY      PATIENT IDENTIFICATION:  NAME: Joe Rivers : 1935 MRN: [id number]  ACCT: [de-identified]     Admit Date: 2019  Discharge date: 2019 12:32 PM     Attending Provider: No att. providers found                                     Dictating Provider: Grace Burkett MD  ______________________________________________________________________________    REASON FOR HOSPITALIZATION:     Principal Problem:    Cardiac arrest ([address]  Active Problems:    Benign essential HTN    Myoclonic jerking    Bradycardia    Anoxic brain injury ([address]    C1 cervical fracture ([address]  Resolved Problems:    * No resolved hospital problems. *        HOSPITAL COURSE AND TREATMENT:  81 y.o male with no known previous cardiac history who was admitted following PEA cardiac arrest at home requiring 2 rounds of CPR with total downtime of about 10-15 mins. Patient found to be hypotensive, bradycardic with small pupils at presentation. He required vasopressors. He was intubated and sedated. Patient also underwent therapeutic hypothermia protocol and rewarming. Patient developed anoxic brain injury with seizure like activity. He was managed in the ICU. Neurology,cardiology and palliative care consulted. Patient's family discussed code status with care team and decided on making patient [facility]. Patient  at 12:32 PM on 2019.     Consults: Neurology, Cardiology, Palliative, Neurosurgery, General surgery    Procedures:  EEG, Endotracheal intubation    Any Hospital Acquired Infections: none      PATIENT'S DISCHARGE CONDITION:            DISCHARGE MEDICATIONS    Discharge Medication List as of 2019  2:35 PM      CONTINUE these medications which have NOT CHANGED    Details   atorvastatin (LIPITOR) 40 MG tablet Take 40 mg by mouth dailyHistorical Med      Cholecalciferol (VITAMIN D3) 2000 units CAPS Take 1 capsule by mouth 2 times dailyHistorical Med      allopurinol (ZYLOPRIM) 100 MG tablet   Take 100 mg by mouth daily , R-0      oxybutynin (DITROPAN) 5 MG tablet   Take 5 mg by mouth 2 times daily Indications: Patient is only taking this once a day       carvedilol (COREG) 6.25 MG tablet   Take 12.5 mg by mouth 2 times daily (with meals) Indications: Patient is taking 6.25mg po twice daily       famotidine (PEPCID) 20 MG tablet Take 20 mg by mouth 2 times daily. ferrous sulfate 325 (65 FE) MG tablet Take 325 mg by mouth 2 times daily.       tamsulosin (FLOMAX) 0.4 MG capsule   Take 0.4 mg by mouth 2 times daily          STOP taking these medications       FeAsp-FeFum -Suc-C-Thre-B12-FA (MULTIGEN PLUS PO) Comments:   Reason for Stopping:                 DISCHARGE INSTRUCTIONS:     Activity: N/A    Diet: N/A    Disposition: Gilford Jefferson, MD  PGY-1 Resident  Internal Medicine  [facility]